# Patient Record
Sex: FEMALE | Race: WHITE | Employment: FULL TIME | URBAN - METROPOLITAN AREA
[De-identification: names, ages, dates, MRNs, and addresses within clinical notes are randomized per-mention and may not be internally consistent; named-entity substitution may affect disease eponyms.]

---

## 2018-05-14 ENCOUNTER — HOSPITAL ENCOUNTER (OUTPATIENT)
Dept: LAB | Age: 28
Discharge: HOME | End: 2018-05-14
Payer: COMMERCIAL

## 2018-05-14 LAB
ALBUMIN SERPL-MCNC: 4 G/DL (ref 3.5–5)
ALBUMIN/GLOB SERPL: 1.2 {RATIO} (ref 1.2–3.5)
ALP SERPL-CCNC: 53 U/L (ref 50–136)
ALT SERPL-CCNC: 20 U/L (ref 12–65)
ANION GAP SERPL CALC-SCNC: 6 MMOL/L (ref 7–16)
AST SERPL-CCNC: 15 U/L (ref 15–37)
BASOPHILS # BLD: 0 K/UL (ref 0–0.2)
BASOPHILS NFR BLD: 0 % (ref 0–2)
BILIRUB SERPL-MCNC: 0.8 MG/DL (ref 0.2–1.1)
BUN SERPL-MCNC: 13 MG/DL (ref 6–23)
CALCIUM SERPL-MCNC: 9.2 MG/DL (ref 8.3–10.4)
CHLORIDE SERPL-SCNC: 103 MMOL/L (ref 98–107)
CO2 SERPL-SCNC: 31 MMOL/L (ref 21–32)
CREAT SERPL-MCNC: 0.75 MG/DL (ref 0.6–1)
DIFFERENTIAL METHOD BLD: ABNORMAL
EOSINOPHIL # BLD: 0 K/UL (ref 0–0.8)
EOSINOPHIL NFR BLD: 0 % (ref 0.5–7.8)
ERYTHROCYTE [DISTWIDTH] IN BLOOD BY AUTOMATED COUNT: 13.7 % (ref 11.9–14.6)
GLOBULIN SER CALC-MCNC: 3.3 G/DL (ref 2.3–3.5)
GLUCOSE SERPL-MCNC: 105 MG/DL (ref 65–100)
HCT VFR BLD AUTO: 43.3 % (ref 35.8–46.3)
HGB BLD-MCNC: 14.9 G/DL (ref 11.7–15.4)
IMM GRANULOCYTES # BLD: 0 K/UL (ref 0–0.5)
IMM GRANULOCYTES NFR BLD AUTO: 0 % (ref 0–5)
LYMPHOCYTES # BLD: 2.5 K/UL (ref 0.5–4.6)
LYMPHOCYTES NFR BLD: 35 % (ref 13–44)
MCH RBC QN AUTO: 28.5 PG (ref 26.1–32.9)
MCHC RBC AUTO-ENTMCNC: 34.4 G/DL (ref 31.4–35)
MCV RBC AUTO: 82.8 FL (ref 79.6–97.8)
MONOCYTES # BLD: 0.7 K/UL (ref 0.1–1.3)
MONOCYTES NFR BLD: 9 % (ref 4–12)
NEUTS SEG # BLD: 3.9 K/UL (ref 1.7–8.2)
NEUTS SEG NFR BLD: 56 % (ref 43–78)
PLATELET # BLD AUTO: 177 K/UL (ref 150–450)
PMV BLD AUTO: 10.8 FL (ref 10.8–14.1)
POTASSIUM SERPL-SCNC: 4.1 MMOL/L (ref 3.5–5.1)
PROT SERPL-MCNC: 7.3 G/DL (ref 6.3–8.2)
RBC # BLD AUTO: 5.23 M/UL (ref 4.05–5.25)
SODIUM SERPL-SCNC: 140 MMOL/L (ref 136–145)
WBC # BLD AUTO: 7.1 K/UL (ref 4.3–11.1)

## 2018-05-14 PROCEDURE — 80053 COMPREHEN METABOLIC PANEL: CPT | Performed by: NURSE PRACTITIONER

## 2018-05-14 PROCEDURE — 85025 COMPLETE CBC W/AUTO DIFF WBC: CPT | Performed by: NURSE PRACTITIONER

## 2018-05-14 PROCEDURE — 36415 COLL VENOUS BLD VENIPUNCTURE: CPT | Performed by: NURSE PRACTITIONER

## 2020-07-22 ENCOUNTER — HOSPITAL ENCOUNTER (OUTPATIENT)
Dept: LAB | Age: 30
Discharge: HOME | End: 2020-07-22
Payer: COMMERCIAL

## 2020-07-22 DIAGNOSIS — R87.619 ABNORMAL CERVICAL PAPANICOLAOU SMEAR, UNSPECIFIED ABNORMAL PAP FINDING: ICD-10-CM

## 2020-07-22 LAB
REFERENCE LAB,REFLB: NORMAL
TEST DESCRIPTION:,ATST: NORMAL

## 2020-07-22 PROCEDURE — 88142 CYTOPATH C/V THIN LAYER: CPT

## 2020-07-22 PROCEDURE — 88305 TISSUE EXAM BY PATHOLOGIST: CPT

## 2020-08-03 ENCOUNTER — TELEPHONE (OUTPATIENT)
Dept: CASE MANAGEMENT | Age: 30
End: 2020-08-03

## 2020-08-03 NOTE — TELEPHONE ENCOUNTER
Patient called triage for pap results. I returned her call and she is aware that no malignant cells were detected.

## 2021-01-25 PROBLEM — F33.41 RECURRENT MAJOR DEPRESSIVE DISORDER, IN PARTIAL REMISSION (HCC): Status: ACTIVE | Noted: 2021-01-25

## 2021-02-04 PROBLEM — Z98.890 HISTORY OF LOOP ELECTROSURGICAL EXCISION PROCEDURE (LEEP): Status: ACTIVE | Noted: 2021-02-04

## 2021-02-04 LAB
HBSAG, EXTERNAL: NORMAL
HEPATITIS C AB,   EXT: NORMAL
HIV, EXTERNAL: NORMAL
RPR, EXTERNAL: NORMAL
RUBELLA, EXTERNAL: NORMAL

## 2021-02-05 PROBLEM — Z34.90 ENCOUNTER FOR SUPERVISION OF NORMAL PREGNANCY, ANTEPARTUM: Status: ACTIVE | Noted: 2021-02-05

## 2021-03-07 PROBLEM — O09.90 SUPERVISION OF HIGH RISK PREGNANCY, ANTEPARTUM: Status: ACTIVE | Noted: 2021-02-05

## 2021-04-01 PROBLEM — O44.02 PLACENTA PREVIA IN SECOND TRIMESTER: Status: ACTIVE | Noted: 2021-04-01

## 2021-05-28 NOTE — TELEPHONE ENCOUNTER
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 Order for [address] Baby Box pending for your convenience. Thank you,  Martha Davis, [facility]  [address]  Department, toll free: [phone], option [address].      =======================================================================    [place] REVIEW - Be Well With Brenna Prather is a 31 y.o. female currently identified as interested in receiving a [address] \"Baby Box\" containing a 1 year supply of prenatal vitamins and Ferrous gluconate 324mg form [facility]. Contents of Baby Box:   Welcome Letter   3 month supply of Certifi Prenatal Gummies (Take 2 gummies once daily) with 3 refills   3 month supply of Ferrous gluconate 324mg tablets (Take 1 tablet once daily) with 3 refills    Thank you  Lindsey Davis, Velia  [address]  Department, toll free: [phone], [address]

## 2021-06-24 PROBLEM — O24.410 DIET CONTROLLED WHITE CLASSIFICATION A1 GESTATIONAL DIABETES MELLITUS (GDM): Status: ACTIVE | Noted: 2021-06-24

## 2021-06-28 ENCOUNTER — PATIENT OUTREACH (OUTPATIENT)
Dept: OTHER | Age: 31
End: 2021-06-28

## 2021-06-28 NOTE — PROGRESS NOTES
ACM attempted to reach patient for Transition of Care/Maternity CM call. HIPAA compliant message left requesting a return phone call at patients convenience. Will continue to follow. BWWB  DUNG 9/18/21  28 WEEKS  NEXT APPT 7/8/21  [facility]OMENS  HSV OUTBREAK AT 22 WEEKS  ANXIETY/ZOLOFT  GEST DIABETES- Decrease carbohydrate intake--goal 50g/day      Check FSBG 4x/day      Please give these parameters:  Fasting:  <95  1hr postprandial:  <120      Bring list of recorded FSBG to our office at your next visit to discuss if medication needed.    YES TO BOTH/VIT/BREAST PUMP

## 2021-06-29 ENCOUNTER — PATIENT OUTREACH (OUTPATIENT)
Dept: OTHER | Age: 31
End: 2021-06-29

## 2021-06-29 NOTE — PROGRESS NOTES
ATTEMPTED TO REACH THIS PT TODAY BUT HER VM IS NOT SET UP COULD NOT LEAVE A VM AND THERE WAS NO ANSWER. Will continue to monitor. Will send utr letter via toucanBox.

## 2021-06-30 ENCOUNTER — PATIENT OUTREACH (OUTPATIENT)
Dept: OTHER | Age: 31
End: 2021-06-30

## 2021-06-30 NOTE — PROGRESS NOTES
Pt message from unable to reach letter. Heljesus  Kalpana I missed your call twice. Im sure its after hours now. You can call me again tomorrow, i will be listening out for your call. Care Transitions subsequent Follow Up Call    Giovanna Miguel contacted the patient by telephone for follow-up call. Verified name and  with patient as identifiers. Risk Factors Identified: Gestational diabetes and Depression/Mental Health    Needs to be reviewed by the provider   none         Method of communication with provider : none      Advance Care Planning:   Does patient have an Advance Directive: not on file     Does patient have OB/Gyn Selected? Yes    Discussed follow up appointments. If no appointment was previously scheduled, appointment scheduling offered: Yes  [facility] follow up appointment(s):   Future Appointments   Date Time Provider Etienne Conroy   2021 11:45 AM [facility]   2021 12:30 PM Tri Tariq MD [facility]     Non-[facility] follow up appointment(s): N/A    OB History:   OB History    Para Term  AB Living   3 1 1   1 1   SAB TAB Ectopic Molar Multiple Live Births             1      # Outcome Date GA Lbr Cj/2nd Weight Sex Delivery Anes PTL Lv   3 Current            2 Term 17 37w3d  6 lb 6 oz (2.892 kg) M Vag-Spont EPI  AUDRA   1 AB      TAB         Obstetric Comments   G1 EAB    G2  17 at 37w3d w/ [address] [employer] (\"Ha\")--complicated by [facility]        28w4d    Medication reconciliation was performed with patient, who verbalizes understanding of administration of home medications. Advised obtaining a 90-day supply of all daily and as-needed medications. Barriers/Support system:  spouse   Return to work planning? Yes    2nd and 3rd Trimester Focused Assessment:   Healthy behavior review, Labs reviewed-discussed bg testing done recently and the results/also looked up what the parameters were listed a few site while on call with pt.  Pt has some questions and concerns with the results and has some gestational diabetic questions that I suggested I refer her over to the pharmacy team that works with these patients. , Fetal movement-baby moving well, Red flags: bleeding/leaking, Labor signs and symptoms-pt not having any she know who to call and when, this is her 2nd baby. She works as a RN in surgery and Nutrition-started some [address] about type of diet she could benefit from to help with the gestational diabetes especially the recommendations from her provider's office. BG ranges-discussed with pt her recommended ranges from ob office thus far  Fasting:  <95   YES TO BOTH/VIT/BREAST PUMP received both  works mon-fri 5-1pm and one long day per week  still working  expecting girl  next ob appt 7/8- dr. Jaswinder Grover  ob visit [facility]  baby moving well/no lof/vag bleeding  2nd baby      Patient given an opportunity to ask questions and does not have any further questions or concerns at this time. Reviewed appropriate site of care based on symptoms and resources available to patient including: Benefits related nurse triage line, When to call 911 and Condition related references. The patient agrees to contact the OB/Gyn office for questions related to their healthcare. Goals      Attends follow-up appointments as directed       Educate and encourage importance of FU for prevention of complications or disease;   Assess the patient's relationship with a PCP and next FU visit scheduled;   Discuss importance of adherence to treatment plan and follow up visits;   Identify any barriers in transportation or access to FU appointments.  Assist patient with making FU appointments as needed;    It's also a good idea to know your test results.  Keep a list of the medicines you take.        Patient will identify signs and symptoms requiring evaluation and intervention      Demonstrate how client is to evaluate contraction activity after discharge (e.g., lying down, tilted to the side with a pillow to the back, placing fingertips on the fundus for approximately 1 hour to note hardening or tightening of the uterus).  Identify signs and/or symptoms that should be reported immediately to the healthcare provider (e.g, sustained uterine contractions, clear drainage from vagina, bleeding).  Provide information about follow-up care when client is discharged.  Advise client to empty bladder every two (2) hours while awake.  Review daily fluid need; avoid coffee.  Encourage regular rest periods 2-3 times a day in side-lying position. If bedrest is to be continued after discharge, suggest client spend part of day on couch or recliner. Red flags maternity  Call physician or midwife, return to Labor and Delivery, call [phone], or go to the nearest Emergency Room if:  increased leakage or fluid, contractions more than 10 per 1 hour, decreased fetal movement, persistent low back pain  or cramping, bleeding from vaginal area, difficulty urinating, pain with urination, difficulty breathing, new calf pain,  persistent headache or vision change              Plan for follow-up call in 10-14 days based on severity of symptoms and risk factors.   Plan for next call: follow up appointment-7/8 appt with dr. Eugenio Tran pt to have us as well

## 2021-06-30 NOTE — PATIENT INSTRUCTIONS
Gestational Diabetes: Care Instructions  Your Care Instructions     Gestational diabetes can develop during pregnancy. When you have this condition, the insulin in your body is not able to keep your blood sugar in a normal range. If you do not control your blood sugar, your baby can grow too big and have problems right after birth such as low blood sugar. Most of the time, gestational diabetes goes away after a baby is born. But if you have had gestational diabetes, you have a greater chance of having it in a future pregnancy and of developing type 2 diabetes. To check for diabetes, you may have a follow-up glucose tolerance test 4 to 12 weeks after your baby is born or after you stop breastfeeding your baby. If the results of this test are normal, experts recommend that you get tested for type 2 diabetes at least every 3 years. You may be able to control your blood sugar with a healthy diet and regular exercise. Staying at a healthy weight also may keep you from getting type 2 diabetes later on. If diet and exercise do not lower your blood sugar enough, you may need to take diabetes medicine or insulin. Follow-up care is a key part of your treatment and safety. Be sure to make and go to all appointments, and call your doctor if you are having problems. It's also a good idea to know your test results and keep a list of the medicines you take. How can you care for yourself at home? · If your doctor prescribes insulin, inject it daily as directed. Your doctor will tell you how and when to take your insulin. · Check your blood sugar as directed. Your doctor will tell you how and when to check your blood sugar. · Monitor your baby's movement as directed. Your doctor may ask you to report how many times in an hour you feel your baby move. · Eat a balanced diet. You may want to meet with a registered dietitian. They can teach you how to spread carbohydrates through the day.  This may keep your blood sugar from going up quickly after meals. If you are taking insulin, you also can learn to match the amount of insulin you take at meals to the amount of carbohydrates you eat. · Don't diet to lose weight. It's not healthy when you're pregnant. · Get daily exercise. This can help lower your blood sugar. Walking and swimming are good choices. But don't do any exercise unless you talk with your doctor first.  When should you call for help? Call 911 anytime you think you may need emergency care. For example, call if:    · You passed out (lost consciousness), or you suddenly become very sleepy or confused. (You may have very low blood sugar.)     · You have symptoms of high blood sugar, such as:  ? Blurred vision. ? Trouble staying awake or being woken up. ? Fast, deep breathing. ? Breath that smells fruity. ? Belly pain, not feeling hungry, and vomiting. ? Feeling confused. Call your doctor now or seek immediate medical care if:    · You are sick and cannot control your blood sugar.     · You have been vomiting or have had diarrhea for more than 6 hours.     · Your blood sugar stays higher than the level your doctor has set for you.     · You have symptoms of low blood sugar, such as:  ? Sweating. ? Feeling nervous, shaky, and weak. ? Extreme hunger and slight nausea. ? Dizziness and headache.  ? Blurred vision. ? Confusion. Watch closely for changes in your health, and be sure to contact your doctor if:    · You have a hard time knowing when your blood sugar is low.     · You have trouble keeping your blood sugar in the target range.     · You often have problems controlling your blood sugar. Where can you learn more? Go to http://www.gray.com/  Enter A800 in the search box to learn more about \"Gestational Diabetes: Care Instructions. \"  Current as of: August 31, 2020               Content Version: 12.8  © 2572-9448 Health[employer], [employer].    Care instructions adapted under license by [employer] (which disclaims liability or warranty for this information). If you have questions about a medical condition or this instruction, always ask your healthcare professional. [address] any warranty or liability for your use of this information. Gestational Diabetes Diet: Care Instructions  Your Care Instructions     Gestational diabetes is a form of diabetes that can happen during pregnancy. It usually goes away after the baby is born. Diabetes means that your pancreas can't make enough insulin or your body does not use insulin properly. Insulin helps sugar enter your cells, where it is used for energy. You may be able to control your blood sugar while you are pregnant by eating a healthy diet and getting regular exercise. A dietitian or certified diabetes educator (CDE) can help you make a food plan. This plan will help control your blood sugar and provide good nutrition for you and your baby. If diet and exercise don't lower or control your blood sugar, you may need diabetes medicine or insulin. Follow-up care is a key part of your treatment and safety. Be sure to make and go to all appointments, and call your doctor if you are having problems. It's also a good idea to know your test results and keep a list of the medicines you take. How can you care for yourself at home? · Learn which foods have carbohydrate. Eating too much carbohydrate will cause your blood sugar to go too high. Carbohydrate foods include:  ? Breads, cereals, pasta, and rice. ? Dried beans and starchy vegetables, like corn, peas, and potatoes. ? Fruits and fruit juice, milk, and yogurt. ? Candy, table sugar, soda pop, and drinks sweetened with sugar. · Learn how much carbohydrate you need each day. A dietitian or certified diabetes educator (CDE) can teach you how to keep track of how much carbohydrate you eat.   · Try to eat the same amount of carbohydrate at each meal. This will help keep your blood sugar steady. Do not save up your daily allowance of carbohydrate to eat at one meal.  · Limit foods that have added sugar. This includes candy, desserts, and soda pop. These foods need to be counted as part of your total carbohydrate intake for the day. · Do not drink alcohol. Alcohol is not safe for you or your baby. · Do not skip meals. Your blood sugar may drop too low if you skip meals and use insulin. · Write down what you eat every day. Review your record with your dietitian or CDE to see if you are eating the right amounts of foods. · Check your blood sugar first thing in the morning before you eat. Then check your blood sugar 1 to 2 hours after the first bite of each meal (or as your doctor recommends). This will help you see how the food you eat affects your blood sugar. Keep track of these levels. Share the record with your doctor. When should you call for help? Watch closely for changes in your health, and be sure to contact your doctor if:    · You have questions about your diet.     · You often have problems with high or low blood sugar. Where can you learn more? Go to http://www.gray.com/  Enter M291 in the search box to learn more about \"Gestational Diabetes Diet: Care Instructions. \"  Current as of: August 31, 2020               Content Version: 12.8  © 2006-2021 "[employer]. Care instructions adapted under license by [employer] (which disclaims liability or warranty for this information). If you have questions about a medical condition or this instruction, always ask your healthcare professional. [place] [address] any warranty or liability for your use of this information. Learning About When to Call Your Doctor During Pregnancy (After 20 Weeks)  Your Care Instructions  It's common to have concerns about what might be a problem during pregnancy.  Although most pregnant women don't have any serious problems, it's important to know when to call your doctor if you have certain symptoms or signs of labor. These are general suggestions. Your doctor may give you some more information about when to call. When to call your doctor (after 20 weeks)  Call 911 anytime you think you may need emergency care. For example, call if:  · You have severe vaginal bleeding. · You have sudden, severe pain in your belly. · You passed out (lost consciousness). · You have a seizure. · You see or feel the umbilical cord. · You think you are about to deliver your baby and can't make it safely to the hospital.  Call your doctor now or seek immediate medical care if:  · You have vaginal bleeding. · You have belly pain. · You have a fever. · You have symptoms of preeclampsia, such as:  ? Sudden swelling of your face, hands, or feet. ? New vision problems (such as dimness, blurring, or seeing spots). ? A severe headache. · You have a sudden release of fluid from your vagina. (You think your water broke.)  · You think that you may be in labor. This means that you've had at least 6 contractions in an hour. · You notice that your baby has stopped moving or is moving much less than normal.  · You have symptoms of a urinary tract infection. These may include:  ? Pain or burning when you urinate. ? A frequent need to urinate without being able to pass much urine. ? Pain in the flank, which is just below the rib cage and above the waist on either side of the back. ? Blood in your urine. Watch closely for changes in your health, and be sure to contact your doctor if:  · You have vaginal discharge that smells bad. · You have skin changes, such as:  ? A rash. ? Itching. ? Yellow color to your skin. · You have other concerns about your pregnancy. If you have labor signs at 37 weeks or more  If you have signs of labor at 37 weeks or more, your doctor may tell you to call when your labor becomes more active.  Symptoms of active labor include:  · Contractions that are regular. · Contractions that are less than 5 minutes apart. · Contractions that are hard to talk through. Follow-up care is a key part of your treatment and safety. Be sure to make and go to all appointments, and call your doctor if you are having problems. It's also a good idea to know your test results and keep a list of the medicines you take. Where can you learn more? Go to http://www.gray.com/  Enter N531 in the search box to learn more about \"Learning About When to Call Your Doctor During Pregnancy (After 20 Weeks). \"  Current as of: 2020               Content Version: 12.8   [employer]. Care instructions adapted under license by [employer] (which disclaims liability or warranty for this information). If you have questions about a medical condition or this instruction, always ask your healthcare professional. [place] [address] any warranty or liability for your use of this information. Weeks 26 to 30 of Your Pregnancy: Care Instructions  Overview     You are now entering your last trimester of pregnancy. Your baby is growing quickly. [place] probably feel your baby moving around more often. Your doctor may ask you to count your baby's kicks. Your back may ache as your body gets used to your baby's size and length. If you haven't already had the Tdap shot during this pregnancy, talk to your doctor about getting it. It will help protect your  against pertussis infection. During this time, it's important to take care of yourself and pay attention to what your body needs. If you feel sexual, you can explore ways to be close with your partner that match your comfort and desire. Follow-up care is a key part of your treatment and safety. Be sure to make and go to all appointments, and call your doctor if you are having problems.  It's also a good idea to know your test results and keep a list of the medicines you take. How can you care for yourself at home? Take it easy at work  · Take frequent breaks. If possible, stop working when you are tired, and rest during your lunch hour. · Take bathroom breaks every 2 hours. · Change positions often. If you sit for long periods, stand up and walk around. · When you stand for a long time, keep one foot on a low stool with your knee bent. After standing a lot, sit with your feet up. · Avoid fumes, chemicals, and tobacco smoke. Be sexual in your own way  · Having sex during pregnancy is okay, unless your doctor tells you not to. · You may be very interested in sex, or you may have no interest at all. · Your growing belly can make it hard to find a good position during intercourse. [place] and explore. · You may get cramps in your uterus when your partner touches your breasts. · A back rub may relieve the backache or cramps that sometimes follow orgasm. Learn about  labor  · Watch for signs of  labor. You may be going into labor if:  ? You have menstrual-like cramps, with or without nausea. ? You have about 6 or more contractions in 1 hour, even after you have had a glass of water and are resting. ? You have a low, dull backache that does not go away when you change your position. ? You have pain or pressure in your pelvis that comes and goes in a pattern. ? You have intestinal cramping or flu-like symptoms, with or without diarrhea.  ? You notice an increase or change in your vaginal discharge. Discharge may be heavy, mucus-like, watery, or streaked with blood. ? Your water breaks. · If you think you have  labor:  ? Drink 2 or 3 glasses of water or juice. Not drinking enough fluids can cause contractions. ? Stop what you are doing, and empty your bladder. Then lie down on your left side for at least 1 hour. ? While lying on your side, find your breast bone.  Put your fingers in the soft spot just below it. Move your fingers down toward your belly button to find the top of your uterus. Check to see if it is tight. ? Contractions can be weak or strong. Record your contractions for an hour. Time a contraction from the start of one contraction to the start of the next one.  ? Single or several strong contractions without a pattern are called [place]-Angel contractions. They are practice contractions but not the start of labor. They often stop if you change what you are doing. ? Call your doctor if you have regular contractions. Where can you learn more? Go to http://www.gray.com/  Enter M121 in the search box to learn more about \"Weeks 26 to 30 of Your Pregnancy: Care Instructions. \"  Current as of: October 8, 2020               Content Version: 12.8  © 2682-6701 [employer]. Care instructions adapted under license by [employer] (which disclaims liability or warranty for this information). If you have questions about a medical condition or this instruction, always ask your healthcare professional. [address] any warranty or liability for your use of this information.

## 2021-07-01 ENCOUNTER — TELEPHONE (OUTPATIENT)
Dept: PHARMACY | Age: 31
End: 2021-07-01

## 2021-07-01 NOTE — TELEPHONE ENCOUNTER
Received email from [address] [address] stating that patient had several questions regarding glucometer and the BWWB program.  Attempted to reach out and could not leave a voicemail. Will send eMaginhart message and followup if needed.     Lidya Farrell, PharmD, [address] [place] [address]  Phone: [phone] option-7

## 2021-07-08 PROBLEM — O36.63X0 EXCESSIVE FETAL GROWTH AFFECTING MANAGEMENT OF PREGNANCY IN THIRD TRIMESTER: Status: ACTIVE | Noted: 2021-07-08

## 2021-07-12 ENCOUNTER — PATIENT OUTREACH (OUTPATIENT)
Dept: OTHER | Age: 31
End: 2021-07-12

## 2021-07-12 NOTE — PROGRESS NOTES
ACM attempted to reach patient for Maternity CM call.  Unable to leave vm, mailbox not set up.    30 WEEK F/U CALL  GDM  HIGH RISK

## 2021-07-13 NOTE — TELEPHONE ENCOUNTER
No response from patient. Will sign off.     Nithin Jewell, PharmD, [address]  Phone: [phone] option-7    For Pharmacy Admin Tracking Only     Time Spent (min): 10

## 2021-07-28 PROBLEM — O24.415 GESTATIONAL DIABETES MELLITUS (GDM) IN THIRD TRIMESTER CONTROLLED ON ORAL HYPOGLYCEMIC DRUG: Status: ACTIVE | Noted: 2021-06-24

## 2021-08-02 PROBLEM — O24.414 INSULIN CONTROLLED GESTATIONAL DIABETES MELLITUS (GDM) IN THIRD TRIMESTER: Status: ACTIVE | Noted: 2021-06-24

## 2021-08-02 PROBLEM — F33.41 RECURRENT MAJOR DEPRESSIVE DISORDER, IN PARTIAL REMISSION (HCC): Status: RESOLVED | Noted: 2021-01-25 | Resolved: 2021-08-02

## 2021-08-02 PROBLEM — O44.02 PLACENTA PREVIA IN SECOND TRIMESTER: Status: RESOLVED | Noted: 2021-04-01 | Resolved: 2021-08-02

## 2021-08-02 PROBLEM — O34.43 HISTORY OF LOOP ELECTROSURGICAL EXCISION PROCEDURE (LEEP) OF CERVIX AFFECTING PREGNANCY IN THIRD TRIMESTER: Status: ACTIVE | Noted: 2021-02-04

## 2021-08-02 PROBLEM — O09.293 HX OF PREECLAMPSIA, PRIOR PREGNANCY, CURRENTLY PREGNANT, THIRD TRIMESTER: Status: ACTIVE | Noted: 2021-08-02

## 2021-08-02 PROBLEM — O36.63X0 EXCESSIVE FETAL GROWTH AFFECTING MANAGEMENT OF PREGNANCY IN THIRD TRIMESTER: Status: RESOLVED | Noted: 2021-07-08 | Resolved: 2021-08-02

## 2021-08-02 PROBLEM — O99.213 OBESITY AFFECTING PREGNANCY IN THIRD TRIMESTER: Status: ACTIVE | Noted: 2021-08-02

## 2021-08-25 LAB — GRBS, EXTERNAL: NORMAL

## 2021-08-26 PROBLEM — M54.9 BACK PAIN AFFECTING PREGNANCY IN THIRD TRIMESTER: Status: ACTIVE | Noted: 2021-08-26

## 2021-08-26 PROBLEM — O09.293 HX OF PREECLAMPSIA, PRIOR PREGNANCY, CURRENTLY PREGNANT, THIRD TRIMESTER: Status: RESOLVED | Noted: 2021-08-02 | Resolved: 2021-08-26

## 2021-08-26 PROBLEM — O99.013 ANEMIA DURING PREGNANCY IN THIRD TRIMESTER: Status: ACTIVE | Noted: 2021-08-26

## 2021-08-26 PROBLEM — O99.891 BACK PAIN AFFECTING PREGNANCY IN THIRD TRIMESTER: Status: ACTIVE | Noted: 2021-08-26

## 2021-09-11 ENCOUNTER — HOSPITAL ENCOUNTER (INPATIENT)
Age: 31
LOS: 2 days | Discharge: HOME | End: 2021-09-13
Attending: OBSTETRICS & GYNECOLOGY | Admitting: OBSTETRICS & GYNECOLOGY
Payer: COMMERCIAL

## 2021-09-11 PROBLEM — Z34.90 ENCOUNTER FOR INDUCTION OF LABOR: Status: ACTIVE | Noted: 2021-09-11

## 2021-09-11 LAB
ERYTHROCYTE [DISTWIDTH] IN BLOOD BY AUTOMATED COUNT: 15.5 % (ref 11.9–14.6)
GLUCOSE BLD STRIP.AUTO-MCNC: 143 MG/DL (ref 65–100)
HCT VFR BLD AUTO: 36.9 % (ref 35.8–46.3)
HGB BLD-MCNC: 11.8 G/DL (ref 11.7–15.4)
MCH RBC QN AUTO: 25 PG (ref 26.1–32.9)
MCHC RBC AUTO-ENTMCNC: 32 G/DL (ref 31.4–35)
MCV RBC AUTO: 78.2 FL (ref 79.6–97.8)
NRBC # BLD: 0 K/UL (ref 0–0.2)
PLATELET # BLD AUTO: 138 K/UL (ref 150–450)
PMV BLD AUTO: 11.8 FL (ref 9.4–12.3)
RBC # BLD AUTO: 4.72 M/UL (ref 4.05–5.2)
SERVICE CMNT-IMP: ABNORMAL
WBC # BLD AUTO: 9.3 K/UL (ref 4.3–11.1)

## 2021-09-11 PROCEDURE — 74011250636 HC RX REV CODE- 250/636: Performed by: OBSTETRICS & GYNECOLOGY

## 2021-09-11 PROCEDURE — 82962 GLUCOSE BLOOD TEST: CPT

## 2021-09-11 PROCEDURE — 85027 COMPLETE CBC AUTOMATED: CPT

## 2021-09-11 PROCEDURE — 74011250637 HC RX REV CODE- 250/637: Performed by: OBSTETRICS & GYNECOLOGY

## 2021-09-11 PROCEDURE — 65270000029 HC RM PRIVATE

## 2021-09-11 PROCEDURE — 74011000258 HC RX REV CODE- 258: Performed by: OBSTETRICS & GYNECOLOGY

## 2021-09-11 PROCEDURE — 86900 BLOOD TYPING SEROLOGIC ABO: CPT

## 2021-09-11 RX ORDER — ZOLPIDEM TARTRATE 5 MG/1
5 TABLET ORAL
Status: DISCONTINUED | OUTPATIENT
Start: 2021-09-11 | End: 2021-09-12

## 2021-09-11 RX ORDER — BUTORPHANOL TARTRATE 2 MG/ML
1 INJECTION INTRAMUSCULAR; INTRAVENOUS
Status: DISCONTINUED | OUTPATIENT
Start: 2021-09-11 | End: 2021-09-12 | Stop reason: HOSPADM

## 2021-09-11 RX ORDER — CALCIUM CARBONATE 200(500)MG
400 TABLET,CHEWABLE ORAL AS NEEDED
Status: DISCONTINUED | OUTPATIENT
Start: 2021-09-11 | End: 2021-09-12

## 2021-09-11 RX ORDER — ONDANSETRON 2 MG/ML
4 INJECTION INTRAMUSCULAR; INTRAVENOUS
Status: DISCONTINUED | OUTPATIENT
Start: 2021-09-11 | End: 2021-09-12

## 2021-09-11 RX ORDER — SODIUM CHLORIDE 0.9 % (FLUSH) 0.9 %
5-40 SYRINGE (ML) INJECTION AS NEEDED
Status: DISCONTINUED | OUTPATIENT
Start: 2021-09-11 | End: 2021-09-12

## 2021-09-11 RX ORDER — SODIUM CHLORIDE 0.9 % (FLUSH) 0.9 %
5-40 SYRINGE (ML) INJECTION EVERY 8 HOURS
Status: DISCONTINUED | OUTPATIENT
Start: 2021-09-11 | End: 2021-09-12

## 2021-09-11 RX ORDER — MINERAL OIL
120 OIL (ML) ORAL
Status: COMPLETED | OUTPATIENT
Start: 2021-09-11 | End: 2021-09-12

## 2021-09-11 RX ORDER — ACETAMINOPHEN 325 MG/1
650 TABLET ORAL
Status: DISCONTINUED | OUTPATIENT
Start: 2021-09-11 | End: 2021-09-12

## 2021-09-11 RX ORDER — LIDOCAINE HYDROCHLORIDE 20 MG/ML
JELLY TOPICAL
Status: DISCONTINUED | OUTPATIENT
Start: 2021-09-11 | End: 2021-09-12 | Stop reason: HOSPADM

## 2021-09-11 RX ORDER — LIDOCAINE HYDROCHLORIDE 10 MG/ML
1 INJECTION INFILTRATION; PERINEURAL
Status: DISCONTINUED | OUTPATIENT
Start: 2021-09-11 | End: 2021-09-12 | Stop reason: HOSPADM

## 2021-09-11 RX ADMIN — SODIUM CHLORIDE 5 MILLION UNITS: 900 INJECTION INTRAVENOUS at 20:07

## 2021-09-11 RX ADMIN — MISOPROSTOL 50 MCG: 100 TABLET ORAL at 20:05

## 2021-09-12 ENCOUNTER — ANESTHESIA (OUTPATIENT)
Dept: LABOR AND DELIVERY | Age: 31
End: 2021-09-12
Payer: COMMERCIAL

## 2021-09-12 ENCOUNTER — ANESTHESIA EVENT (OUTPATIENT)
Dept: LABOR AND DELIVERY | Age: 31
End: 2021-09-12
Payer: COMMERCIAL

## 2021-09-12 LAB
ABO + RH BLD: NORMAL
BLOOD GROUP ANTIBODIES SERPL: NORMAL
SPECIMEN EXP DATE BLD: NORMAL

## 2021-09-12 PROCEDURE — 74011000250 HC RX REV CODE- 250: Performed by: ANESTHESIOLOGY

## 2021-09-12 PROCEDURE — 74011000258 HC RX REV CODE- 258: Performed by: OBSTETRICS & GYNECOLOGY

## 2021-09-12 PROCEDURE — 74011250636 HC RX REV CODE- 250/636: Performed by: OBSTETRICS & GYNECOLOGY

## 2021-09-12 PROCEDURE — 75410000002 HC LABOR FEE PER 1 HR

## 2021-09-12 PROCEDURE — 74011000250 HC RX REV CODE- 250: Performed by: NURSE ANESTHETIST, CERTIFIED REGISTERED

## 2021-09-12 PROCEDURE — 74011250636 HC RX REV CODE- 250/636: Performed by: ANESTHESIOLOGY

## 2021-09-12 PROCEDURE — 74011250636 HC RX REV CODE- 250/636

## 2021-09-12 PROCEDURE — 77030018846 HC SOL IRR STRL H20 ICUM -A

## 2021-09-12 PROCEDURE — 75410000003 HC RECOV DEL/VAG/CSECN EA 0.5 HR

## 2021-09-12 PROCEDURE — A4300 CATH IMPL VASC ACCESS PORTAL: HCPCS | Performed by: ANESTHESIOLOGY

## 2021-09-12 PROCEDURE — 77030003028 HC SUT VCRL J&J -A

## 2021-09-12 PROCEDURE — 76060000078 HC EPIDURAL ANESTHESIA

## 2021-09-12 PROCEDURE — 74011250637 HC RX REV CODE- 250/637: Performed by: OBSTETRICS & GYNECOLOGY

## 2021-09-12 PROCEDURE — 75410000000 HC DELIVERY VAGINAL/SINGLE

## 2021-09-12 PROCEDURE — 77030014125 HC TY EPDRL BBMI -B: Performed by: ANESTHESIOLOGY

## 2021-09-12 PROCEDURE — 77030011945 HC CATH URIN INT ST MENT -A

## 2021-09-12 PROCEDURE — 36415 COLL VENOUS BLD VENIPUNCTURE: CPT

## 2021-09-12 PROCEDURE — 65270000029 HC RM PRIVATE

## 2021-09-12 PROCEDURE — 0HQ9XZZ REPAIR PERINEUM SKIN, EXTERNAL APPROACH: ICD-10-PCS | Performed by: OBSTETRICS & GYNECOLOGY

## 2021-09-12 RX ORDER — DEXTROSE, SODIUM CHLORIDE, SODIUM LACTATE, POTASSIUM CHLORIDE, AND CALCIUM CHLORIDE 5; .6; .31; .03; .02 G/100ML; G/100ML; G/100ML; G/100ML; G/100ML
125 INJECTION, SOLUTION INTRAVENOUS CONTINUOUS
Status: DISCONTINUED | OUTPATIENT
Start: 2021-09-12 | End: 2021-09-12

## 2021-09-12 RX ORDER — OXYTOCIN/RINGER'S LACTATE 30/500 ML
0-20 PLASTIC BAG, INJECTION (ML) INTRAVENOUS
Status: DISCONTINUED | OUTPATIENT
Start: 2021-09-12 | End: 2021-09-12

## 2021-09-12 RX ORDER — OXYTOCIN/RINGER'S LACTATE 30/500 ML
10 PLASTIC BAG, INJECTION (ML) INTRAVENOUS AS NEEDED
Status: COMPLETED | OUTPATIENT
Start: 2021-09-12 | End: 2021-09-12

## 2021-09-12 RX ORDER — LIDOCAINE HYDROCHLORIDE AND EPINEPHRINE 15; 5 MG/ML; UG/ML
INJECTION, SOLUTION EPIDURAL
Status: COMPLETED | OUTPATIENT
Start: 2021-09-12 | End: 2021-09-12

## 2021-09-12 RX ORDER — DOCUSATE SODIUM 100 MG/1
100 CAPSULE, LIQUID FILLED ORAL 2 TIMES DAILY
Status: DISCONTINUED | OUTPATIENT
Start: 2021-09-12 | End: 2021-09-13 | Stop reason: HOSPADM

## 2021-09-12 RX ORDER — SODIUM CHLORIDE 0.9 % (FLUSH) 0.9 %
5-40 SYRINGE (ML) INJECTION EVERY 8 HOURS
Status: DISCONTINUED | OUTPATIENT
Start: 2021-09-12 | End: 2021-09-12 | Stop reason: HOSPADM

## 2021-09-12 RX ORDER — OXYTOCIN/RINGER'S LACTATE 30/500 ML
87.3 PLASTIC BAG, INJECTION (ML) INTRAVENOUS AS NEEDED
Status: DISCONTINUED | OUTPATIENT
Start: 2021-09-12 | End: 2021-09-13 | Stop reason: HOSPADM

## 2021-09-12 RX ORDER — SIMETHICONE 80 MG
80 TABLET,CHEWABLE ORAL
Status: DISCONTINUED | OUTPATIENT
Start: 2021-09-12 | End: 2021-09-13 | Stop reason: HOSPADM

## 2021-09-12 RX ORDER — FAMOTIDINE 20 MG/1
20 TABLET, FILM COATED ORAL ONCE
Status: DISCONTINUED | OUTPATIENT
Start: 2021-09-12 | End: 2021-09-12 | Stop reason: HOSPADM

## 2021-09-12 RX ORDER — DIPHENHYDRAMINE HCL 25 MG
25 CAPSULE ORAL
Status: DISCONTINUED | OUTPATIENT
Start: 2021-09-12 | End: 2021-09-13 | Stop reason: HOSPADM

## 2021-09-12 RX ORDER — PRENATAL VIT 96/IRON FUM/FOLIC 27MG-0.8MG
1 TABLET ORAL DAILY
Status: DISCONTINUED | OUTPATIENT
Start: 2021-09-12 | End: 2021-09-13 | Stop reason: HOSPADM

## 2021-09-12 RX ORDER — OXYTOCIN/RINGER'S LACTATE 30/500 ML
PLASTIC BAG, INJECTION (ML) INTRAVENOUS
Status: COMPLETED
Start: 2021-09-12 | End: 2021-09-12

## 2021-09-12 RX ORDER — SODIUM CHLORIDE 0.9 % (FLUSH) 0.9 %
5-40 SYRINGE (ML) INJECTION AS NEEDED
Status: DISCONTINUED | OUTPATIENT
Start: 2021-09-12 | End: 2021-09-12 | Stop reason: HOSPADM

## 2021-09-12 RX ORDER — IBUPROFEN 600 MG/1
600 TABLET ORAL
Status: DISCONTINUED | OUTPATIENT
Start: 2021-09-12 | End: 2021-09-13 | Stop reason: HOSPADM

## 2021-09-12 RX ORDER — SERTRALINE HYDROCHLORIDE 100 MG/1
100 TABLET, FILM COATED ORAL
Status: DISCONTINUED | OUTPATIENT
Start: 2021-09-12 | End: 2021-09-13 | Stop reason: HOSPADM

## 2021-09-12 RX ORDER — ONDANSETRON 4 MG/1
4 TABLET, ORALLY DISINTEGRATING ORAL
Status: DISCONTINUED | OUTPATIENT
Start: 2021-09-12 | End: 2021-09-13 | Stop reason: HOSPADM

## 2021-09-12 RX ORDER — ZOLPIDEM TARTRATE 5 MG/1
5 TABLET ORAL
Status: DISCONTINUED | OUTPATIENT
Start: 2021-09-12 | End: 2021-09-13 | Stop reason: HOSPADM

## 2021-09-12 RX ORDER — ROPIVACAINE HYDROCHLORIDE 2 MG/ML
INJECTION, SOLUTION EPIDURAL; INFILTRATION; PERINEURAL AS NEEDED
Status: DISCONTINUED | OUTPATIENT
Start: 2021-09-12 | End: 2021-09-12 | Stop reason: HOSPADM

## 2021-09-12 RX ORDER — OXYCODONE HYDROCHLORIDE 5 MG/1
5 TABLET ORAL
Status: DISCONTINUED | OUTPATIENT
Start: 2021-09-12 | End: 2021-09-13 | Stop reason: HOSPADM

## 2021-09-12 RX ORDER — LIDOCAINE HYDROCHLORIDE 10 MG/ML
INJECTION INFILTRATION; PERINEURAL AS NEEDED
Status: DISCONTINUED | OUTPATIENT
Start: 2021-09-12 | End: 2021-09-12 | Stop reason: HOSPADM

## 2021-09-12 RX ORDER — NALOXONE HYDROCHLORIDE 0.4 MG/ML
0.4 INJECTION, SOLUTION INTRAMUSCULAR; INTRAVENOUS; SUBCUTANEOUS AS NEEDED
Status: DISCONTINUED | OUTPATIENT
Start: 2021-09-12 | End: 2021-09-13 | Stop reason: HOSPADM

## 2021-09-12 RX ADMIN — SODIUM CHLORIDE 2.5 MILLION UNITS: 9 INJECTION, SOLUTION INTRAVENOUS at 00:15

## 2021-09-12 RX ADMIN — DOCUSATE SODIUM 100 MG: 100 CAPSULE, LIQUID FILLED ORAL at 17:50

## 2021-09-12 RX ADMIN — DOCUSATE SODIUM 100 MG: 100 CAPSULE, LIQUID FILLED ORAL at 10:41

## 2021-09-12 RX ADMIN — Medication 30000 MILLI-UNITS: at 03:55

## 2021-09-12 RX ADMIN — IBUPROFEN 600 MG: 600 TABLET, FILM COATED ORAL at 12:31

## 2021-09-12 RX ADMIN — ROPIVACAINE HYDROCHLORIDE 4 ML: 2 INJECTION, SOLUTION EPIDURAL; INFILTRATION at 03:16

## 2021-09-12 RX ADMIN — SERTRALINE 100 MG: 100 TABLET, FILM COATED ORAL at 21:08

## 2021-09-12 RX ADMIN — MISOPROSTOL 50 MCG: 100 TABLET ORAL at 00:15

## 2021-09-12 RX ADMIN — IBUPROFEN 600 MG: 600 TABLET, FILM COATED ORAL at 06:55

## 2021-09-12 RX ADMIN — MINERAL OIL 120 ML: 1000 LIQUID ORAL at 03:40

## 2021-09-12 RX ADMIN — SODIUM CHLORIDE, SODIUM LACTATE, POTASSIUM CHLORIDE, AND CALCIUM CHLORIDE 1000 ML: 600; 310; 30; 20 INJECTION, SOLUTION INTRAVENOUS at 03:00

## 2021-09-12 RX ADMIN — LIDOCAINE HYDROCHLORIDE,EPINEPHRINE BITARTRATE 3.5 ML: 15; .005 INJECTION, SOLUTION EPIDURAL; INFILTRATION; INTRACAUDAL; PERINEURAL at 03:03

## 2021-09-12 RX ADMIN — ROPIVACAINE HYDROCHLORIDE 9 ML/HR: 2 INJECTION, SOLUTION EPIDURAL; INFILTRATION at 03:18

## 2021-09-12 RX ADMIN — PRENATAL VIT W/ FE FUMARATE-FA TAB 27-0.8 MG 1 TABLET: 27-0.8 TAB at 10:41

## 2021-09-12 RX ADMIN — ROPIVACAINE HYDROCHLORIDE 5 ML: 2 INJECTION, SOLUTION EPIDURAL; INFILTRATION at 03:14

## 2021-09-12 RX ADMIN — LIDOCAINE HYDROCHLORIDE 5 ML: 10 INJECTION, SOLUTION INFILTRATION; PERINEURAL at 03:29

## 2021-09-12 RX ADMIN — IBUPROFEN 600 MG: 600 TABLET, FILM COATED ORAL at 17:50

## 2021-09-12 NOTE — H&P
History & Physical    Name: Ken Grey MRN: [id number]  SSN: [id number]    YOB: 1990  Age: 36 y.o. Sex: female      Subjective:     Estimated Date of Delivery: 21  OB History    Para Term  AB Living   3 1 1   1 1   SAB TAB Ectopic Molar Multiple Live Births             1      # Outcome Date GA Lbr Cj/2nd Weight Sex Delivery Anes PTL Lv   3 Current            2 Term 17 37w3d  2.892 kg M Vag-Spont EPI  AUDRA   1 AB      TAB         Obstetric Comments   G1 EAB    G2  17 at 44w3d w/ [facility] (\[facility]")--complicated by Lucas Gomez is admitted with pregnancy at 39w1d for induction of labor due to  gestational diabetes, on insulin. Prenatal course was normal.  Please see prenatal records for details. She was admitted at 39.0 wkg for cervical ripening and received two doses of cytotec. She denies any complaints at present. Notes feeling pressure with contractions and comfortable after epidural placement.      Past Medical History:   Diagnosis Date    Abnormal Papanicolaou smear of cervix     Anemia during pregnancy in third trimester 2021    Anxiety     BMI 31.0-31.9,adult     Chlamydia     Depression     Genital herpes     High grade squamous intraepithelial lesion (HGSIL), grade 3 LI, on biopsy of cervix     LEEP     History of gestational hypertension     HSV-1 infection     Recurrent major depressive disorder, in partial remission ([address] 2021    Thrombocytopenia ([address]      Past Surgical History:   Procedure Laterality Date    HX BREAST AUGMENTATION      HX COLPOSCOPY      HX LEEP PROCEDURE      HX WISDOM TEETH EXTRACTION       Social History     Occupational History    Not on file   Tobacco Use    Smoking status: Never Smoker    Smokeless tobacco: Never Used   Vaping Use    Vaping Use: Never used   Substance and Sexual Activity    Alcohol use: Never    Drug use: Never    Sexual activity: Yes     Partners: Male     Birth control/protection: None     Family History   Problem Relation Age of Onset    Hypertension Father     Cancer Father         Leukemia    Colon Cancer Father 48    Breast Cancer Maternal Aunt 27    Diabetes Paternal Grandfather     Ovarian Cancer Neg Hx        No Known Allergies  Prior to Admission medications    Medication Sig Start Date End Date Taking? Authorizing Provider   famotidine (Pepcid) 20 mg tablet Take 20 mg by mouth two (2) times a day. Yes Provider, Historical   insulin detemir U-100 (Levemir FlexTouch U-100 Insuln) 100 unit/mL (3 mL) inpn 12-25 units as directed twice daily; May substitute Lantus/Basaglar/Semglee 8/2/21  Yes Cori Malave MD   Insulin Needles, Disposable, (Unifine Pentips) 32 gauge x 5/32\" ndle Use up to 5x/day; Per insurance coverage 8/2/21  Yes Cori Malave MD   Blood-Glucose Meter monitoring kit One generic blood glucose meter. Test blood sugars four times daily: fasting and 1 hour after each meal. 7/8/21  Yes Lukasz Galaviz MD   lancets misc Generic lancets. Test blood sugars four times daily: fasting and 1 hour after each meal. 7/8/21  Yes Lukasz Galaviz MD   glucose blood VI test strips (ASCENSIA AUTODISC VI, ONE TOUCH ULTRA TEST VI) strip Generic test strips. Test blood sugars four times daily: fasting and 1 hour after each meal. 7/8/21  Yes Lukasz Galaviz MD   valACYclovir (VALTREX) 1 gram tablet Take 1 Tablet by mouth daily. 5/27/21  Yes Lukasz Galaviz MD   sertraline (ZOLOFT) 100 mg tablet Take 1 Tablet by mouth daily. 5/27/21  Yes Lukasz Galaviz MD   prenatal vit/iron fum/folic ac (PRENATAL 1+1 PO) Take  by mouth. Yes Provider, Historical        Review of Systems: A comprehensive review of systems was negative except for that written in the History of Present Illness.     Objective:     Vitals:  Vitals:    09/12/21 0320 09/12/21 0321 09/12/21 0322 09/12/21 0326   BP: (!) 151/77 137/73 130/71 (!) 144/98   Pulse: 98 86 91 96   Resp: Temp:       SpO2:       Weight:       Height:            Physical Exam:  Patient without distress. Lung: normal respiratory effort, equal expansion  Abdomen: soft, nontender  Fundus: soft and non tender  Perineum: blood absent, amniotic fluid present   Cervical Exam: 10 cm dilated    100% effaced    +1 station    Lower Extremities:  - Edema No  Membranes:  Spontaneous Rupture of Membranes; Amniotic Fluid: clear fluid  Fetal Heart Rate: Baseline: 155 per minute  Variability: moderate  Accelerations: yes  Decelerations: none          Prenatal Labs:   Lab Results   Component Value Date/Time    ABO/Rh(D) A POSITIVE 09/11/2021 07:50 PM    Rubella, External Immune 02/04/2021 12:00 AM    HBsAg, External Neg 02/04/2021 12:00 AM    HIV, External NR 02/04/2021 12:00 AM    RPR, External NR 02/04/2021 12:00 AM    GrBStrep, External POS 08/25/2021 12:00 AM       Impression/Plan:     Active Problems:    Encounter for induction of labor (9/11/2021)         Plan: Admit for induction of labor. Group B Strep positive, treated with penicillin. Now 10/100/+1. Will initiate pushing efforts.

## 2021-09-12 NOTE — PROGRESS NOTES
SBAR OUT Report: Mother    Verbal report given to Jaleesa Olivo RN (full name & credentials) on this patient, who is now being transferred to MIU (unit) for routine progression of care. The patient is not wearing a green \"Anesthesia-Duramorph\" band. Report consisted of patient's Situation, Background, Assessment and Recommendations (SBAR).  ID bands were compared with the identification form, and verified with the patient and receiving nurse. Information from the SBAR, Procedure Summary, Intake/Output, MAR and Recent Results and the [place] Report was reviewed with the receiving nurse; opportunity for questions and clarification provided.

## 2021-09-12 NOTE — L&D DELIVERY NOTE
Delivery Summary    Patient: Yvette Mcarthur MRN: [id number]  SSN: [id number]    YOB: 1990  Age: 36 y.o. Sex: female       Information for the patient's :  Vijaysofie Mezant [[id number]]       Labor Events:    Labor: No    Steroids: None   Cervical Ripening Date/Time: 2021 8:00 PM   Cervical Ripening Type: Misoprostol   Antibiotics During Labor: Yes   Rupture Identifier:      Rupture Date/Time: 2021 10:25 PM   Rupture Type: SROM   Amniotic Fluid Volume:      Amniotic Fluid Description: Clear    Amniotic Fluid Odor: None    Induction:         Induction Date/Time:        Indications for Induction:      Augmentation:     Augmentation Date/Time:      Indications for Augmentation:     Labor complications: None       Additional complications:        Delivery Events:  Indications For Episiotomy:     Episiotomy: None   Perineal Laceration(s):     Repaired:     Periurethral Laceration Location:      Repaired: Yes   Labial Laceration Location:     Repaired:     Sulcal Laceration Location:     Repaired:     Vaginal Laceration Location:     Repaired:     Cervical Laceration Location:     Repaired:     Repair Suture: Vicryl 3-0   Number of Repair Packets:     Estimated Blood Loss (ml):  ml   Quantitative Blood Loss (ml)                Delivery Date: 2021    Delivery Time: 3:47 AM  Delivery Type: Vaginal, Spontaneous  Sex:  Female    Gestational Age: 39w1d   Delivery Clinician:  Shira Saxena  Living Status: Living   Delivery Location: L&D 426          APGARS  One minute Five minutes Ten minutes   Skin color: 0   1        Heart rate: 2   2        Grimace: 2   2        Muscle tone: 2   2        Breathin   2        Totals: 8   9            Presentation: Vertex    Position: Middle Occiput Anterior  Resuscitation Method:  Suctioning-bulb; Tactile Stimulation     Meconium Stained: None      Cord Information: 3 Vessels  Complications: Knot  Cord around:    Delayed cord clamping? Yes  Cord clamped date/time:   Disposition of Cord Blood: Lab    Blood Gases Sent?: No    Placenta:  Date/Time: 2021  3:54 AM  Removal: Spontaneous      Appearance: Normal      Measurements:  Birth Weight: 3.185 kg      Birth Length: 50.2 cm      Head Circumference: 34 cm      Chest Circumference: 33 cm     Abdominal Girth: Other Providers:   Junior Shashank MIRANDA;EUNICE BUNDY;KEEGAN BUENO, Obstetrician;Primary Nurse;Primary  Nurse;Scrub Tech           Group B Strep:   Lab Results   Component Value Date/Time    GrBStrep, External POS 2021 12:00 AM     Information for the patient's :  Alexandra Segura [[id number]]   No results found for: ABORH, PCTABR, PCTDIG, BILI, ABORHEXT, ABORH     No results for input(s): PCO2CB, PO2CB, HCO3I, SO2I, IBD, PTEMPI, SPECTI, PHICB, ISITE, IDEV, IALLEN in the last 72 hours.        Delivery Note    Obstetrician:  Zachary Garcia MD    Assistant: none    Pre-Delivery Diagnosis: Term pregnancy, Induced labor and Pregnancy complicated by: gestational diabetes on insulin, HSV on valtrex suppression, gestational thrombocytopenia, and anxiety    Post-Delivery Diagnosis: Living  infant(s) and Female named Mackenzie    Intrapartum Event: None    Procedure: Spontaneous vaginal delivery    Epidural: YES    Monitor:  Fetal Heart Tones - External and Uterine Contractions - External    Indications for instrumental delivery: none    Estimated Blood Loss: 150 mL    Episiotomy: none    Laceration(s): Superficial perineal laceration, periurethral laceration    Laceration(s) repair: YES    Presentation: Cephalic    Fetal Description: jolly    Fetal Position: Occiput Anterior    Birth Weight: 3185 g    Birth Length: 50.2 cm    Apgar - One Minute: 8    Apgar - Five Minutes: 9    Umbilical Cord: True knot x 3  Specimens: placenta, discarded  Complications:  none           Cord Blood Results:   Information for the patient's :  Ermias Yuan Mary Mcpherson [[id number]]   No results found for: PCTABR, PCTDIG, BILI, ABORH     Prenatal Labs:     Lab Results   Component Value Date/Time    ABO/Rh(D) A POSITIVE 09/11/2021 07:50 PM    HBsAg, External Neg 02/04/2021 12:00 AM    HIV, External NR 02/04/2021 12:00 AM    Rubella, External Immune 02/04/2021 12:00 AM    RPR, External NR 02/04/2021 12:00 AM    GrBStrep, External POS 08/25/2021 12:00 AM        Procedure Description:  Patient reached complete dilation at +1 and was feeling urges and pressure to push. With maternal expulsive efforts over two contractions, delivered fetal head in direct occiput anterior position. Anterior shoulder delivered with gentle downward guidance followed by posterior shoulder and remainder of fetal body with usual maneuvers. Mouth and nose were bulb suctioned and infant placed on mother's chest for skin-to-skin contact. Umbilical cord was inspected and found to have 3 true knots present in it. Cord blood was then obtained for routine analysis. Placenta was then expressed and delivered. Bimanual exam with expression of small amount of retained membranes evacuated with no further evidence of retained products of conception with firm uterine tone and lower uterine segment clamped down. Inspection of vagina and perineum revealed a midline periurethral laceration and superficial perineal laceration. These were repaired with figure-of-eight sutures of 3-0 Vicryl without complication. Mother and infant stable immediately postpartum.       Attending Attestation: I was present and scrubbed for the entire procedure

## 2021-09-12 NOTE — PROGRESS NOTES
Pt laid back from epidural, feeling a lot of pressure. SVE c/c/+1. MD notified, coming from [facility], on her way.

## 2021-09-12 NOTE — ANESTHESIA POSTPROCEDURE EVALUATION
RY for labor and vaginal delivery    epidural    Anesthesia Post Evaluation      Multimodal analgesia: multimodal analgesia used between 6 hours prior to anesthesia start to PACU discharge  Patient location during evaluation: PACU  Patient participation: complete - patient participated  Level of consciousness: awake  Pain management: adequate  Airway patency: patent  Anesthetic complications: no  Cardiovascular status: acceptable and hemodynamically stable  Respiratory status: acceptable  Hydration status: acceptable  Post anesthesia nausea and vomiting:  none  Final Post Anesthesia Temperature Assessment:  Normothermia (36.0-37.5 degrees C)      INITIAL Post-op Vital signs: No vitals data found for the desired time range.

## 2021-09-12 NOTE — PROGRESS NOTES
Since SROM pt c/o increasing pain. Now rating pain 8/10. SVE shows slight change. Dr. Magda Reyes called and is agreeable to early epidural. If still unchanged by time next dose of Cytotec is due, MD would like to get third dose in if warranted. If cervical change noted, Can start pitocin in am. Bolusing for epidural now.

## 2021-09-12 NOTE — PROGRESS NOTES
Admission assessment complete see flowsheet. Pt oriented to room, call light and how to order meals. Discussed today plan of care with pt. Pt has pitocin infusing via pump with approximately 200ml remaining in bag. Pt denies h/a, vision changes, n/v. Pt aware to keep track of her I&O's. Bleeding precautions given. Questions encouraged and answered. Pt to call with needs/concerns. No s/s of distress noted. Pt in bed with call light in reach.

## 2021-09-12 NOTE — PROGRESS NOTES
Nutrition:  Best Practice Alert received for GDM. Diet: No diet orders on file    Patient is admitted for labor. Will defer assessment as per standard of care.     Electronically signed by José Luis Recinos MS, RD, LD on 9/12/2021 at 8:46 AM.

## 2021-09-12 NOTE — PROGRESS NOTES
Pt admitted to room 426. RN to bedside for admission assessment. Pt has arrived for scheduled cervical ripening with induction of labor. Consents signed. Admission database complete. POC discussed with pt. Questions and concerns discussed. EFM on and tracing. IV started, labs collected and sent. Bed low and locked, call light within reach. , Harshal Leslie at bedside. No needs/concerns at this time.

## 2021-09-12 NOTE — PROGRESS NOTES
SBAR IN Report: Mother    Verbal report received from Kylah Sharma RN (full name & credentials) on this patient, who is now being transferred from L&D (unit) for routine progression of care. The patient is not wearing a green \"Anesthesia-Duramorph\" band. Report consisted of patient's Situation, Background, Assessment and Recommendations (SBAR). [place] ID bands were compared with the identification form, and verified with the patient and transferring nurse. Information from the SBAR, Procedure Summary, Intake/Output, MAR and Recent Results and the [place] Report was reviewed with the transferring nurse; opportunity for questions and clarification provided.

## 2021-09-12 NOTE — PROGRESS NOTES
Imelda Arellano at bedside at 0300. GINA Wahl at bedside at 77 [employer] pt to sitting up on bedside at 0257. Timeout completed at 46 with MD, GINA and myself at bedside. Test dose given at 0311. Negative reaction. Dose given at 0317. Pt assisted to lying back in right tilt position. See anesthesia record for details. See vital sign flow sheet for BP. Tolerated procedure well.

## 2021-09-12 NOTE — PROGRESS NOTES
Called Dr. Ollie Maria to obtain nighttime prn medications (see orders). Updated MD on SVE, ctx pattern and induction status. Continue wit POC as planned.

## 2021-09-12 NOTE — PROGRESS NOTES
Pt called out requesting RN. Pt states \"something wet is dripping down my leg\". SROM confirmed with Nitrazine paper, which immediately turned bright blue. Discussed ROM with pt and how this may or may not change her POC. Pt states understanding. Offered new linens and a towel.

## 2021-09-12 NOTE — PROGRESS NOTES
Dr. Fior Archuleta at nurses station, per MD pt does not need to have her BS checked. Also per MD can reorder pt Zoloft 100mg every day. Above orders read back and verified.

## 2021-09-12 NOTE — PROGRESS NOTES
From: Huong Mckeon  To: Yuliana Carvajal DO  Sent: 3/1/2020 11:21 AM CST  Subject: Non-Urgent Medical Question    CARMELLA Hargrove. Huong Mckeon here. I saw Dr. Allison on February 10 and there were no signs of the cancer returning. He did say if there were any symptoms I should call him. It was less than a week when I began to feel more short of breath, rather in general than at times. My right outer ankle, about that same time, started to feel tight and slightly tender, with a pulling sensation going up and down stairs, etc. It is not red or warmer than usual as it was in August of 2014. Though the area is slightly swollen. That is the same ankle that it was finally felt to be an enlarged lymph node impairing the flow of a blood vessel causing the problem. I have been experiencing increased fatigue, and I did tell Dr. Allison that. Although the last 3 weeks I have been wiped out after supper is put away. I have worked full time hours during that time instead of working 4 days a week. 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 So, perhaps I just worked too much. I did have a couple of nights that I didn't sleep well because of my legs and feet. I have been waiting to see if these things improved. Not that I make an appointment and suddenly I am better! But the ankle and difficulty breathing have continued about the same. Should I call Dr. Allison? Did you want to see me before I call him? Should I wait a little longer to see if my symptoms improve? I feel a bit ridiculous because I just saw him 3 weeks ago. I don't want to be making a mountain out of a mole hill. Thanks. Huong   In chart to review prior to admission to MIU

## 2021-09-12 NOTE — PROGRESS NOTES
Safety Teaching reviewed:   1. Hand hygiene prior to handling the infant. 2. Use of bulb syringe  3. Bracelets with matching numbers are placed on mother and infant  3. An infant security tag  [facility]) is placed on the infant's ankle and monitored  5. All OB nurses wear pink Employee badges - do not give your baby to anyone without proper identification. 6. Never leave the baby alone in the room. 7. The infant should be placed on their back to sleep. on a firm mattress. No toys should be placed in the crib. (safe sleep video offered to view)  8. Never shake the baby (video offered to view)  9. Infant fall prevention - do not sleep with the baby, and place the baby in the crib while ambulating. 8. Mother and Baby Care booklet given to Mother.

## 2021-09-12 NOTE — ANESTHESIA PROCEDURE NOTES
Epidural Block    Patient location during procedure: OB  Start time: 9/12/2021 3:03 AM  End time: 9/12/2021 3:13 AM  Reason for block: labor epidural  Staffing  Performed: attending   Anesthesiologist: Rebeka Medellin MD  Preanesthetic Checklist  Completed: patient identified, IV checked, risks and benefits discussed, surgical consent, monitors and equipment checked, pre-op evaluation and timeout performed  Block Placement  Patient position: sitting  Prep: ChloraPrep  Sterility prep: cap, drape, mask, hand and gloves  Sedation level: no sedation  Patient monitoring: continuous pulse oximetry and heart rate  Approach: midline  Location: lumbar  Lumbar location: L4-L5  Epidural  Loss of resistance technique: saline  Guidance: landmark technique  Needle  Needle type: Tuohy   Needle gauge: 17 G  Needle length: 9 cm  Needle insertion depth: 6 cm  Catheter type: end hole  Catheter size: 19 G  Catheter at skin depth: 12 cm  Catheter securement method: surgical tape, clear occlusive dressing and liquid medical adhesive  Test dose: negative  Medications Administered  Lidocaine-EPINEPHrine (XYLOCAINE) 1.5 %-1:200,000 Epidural, 3.5 mL  Assessment  Block outcome: pain improved  Number of attempts: 1  Procedure assessment: patient tolerated procedure well with no immediate complications  Additional Notes  Epidural timeout performed prior to procedure. Negative aspiration for blood and CSF. No intravascular on intrathecal symptoms with test dose.

## 2021-09-12 NOTE — PROGRESS NOTES
Progress Note                               Patient: Dickson Mullins MRN: [id number]  SSN: [id number]    YOB: 1990  Age: 36 y.o. Sex: female      Postpartum Day Number 0    Subjective:     Sharmin Miller is seen and examined at bedside. She has been able to get a little bit of rest post-delivery. Notes that bleeding is moderate, cramping not too bad. She has tried breastfeeding and reports infant with good latch. Objective:     Patient Vitals for the past 18 hrs:   Temp Pulse Resp BP SpO2   21 0722  72  (!) 153/81    21 0710  74  (!) 151/69    21 0610  73  (!) 134/94    21 0555  68  131/65    21 0540  68  130/66    21 0525  68  133/73    21 0510  68  121/87    21 0440  67  129/60    21 0410 98.3 °F (36.8 °C) 85  131/71    21 0336  (!) 102  (!) 134/92    21 0329  (!) 118  (!) 131/91    21 0328  87  139/79    21 0326  96  (!) 144/98    21 0324  (!) 109  (!) 164/82    21 0322  91  130/71    21 0321  86  137/73    21 0320  98  (!) 151/77    21 0318  72  (!) 144/76    21 0317  (!) 112  (!) 180/129    21 0316  73  (!) 161/143    21 0311  77  (!) 161/90    21 0235 98.5 °F (36.9 °C)       21 0029 98.1 °F (36.7 °C) 69  (!) 145/82    21 2231 98.5 °F (36.9 °C)       21 1948 98.7 °F (37.1 °C) 90 16 (!) 142/87 99 %        Temp (24hrs), Av.4 °F (36.9 °C), Min:98.1 °F (36.7 °C), Max:98.7 °F (37.1 °C)      Physical Exam:    Patient without distress. Heart: Regular rate and rhythm  Lung: clear to auscultation throughout lung fields and normal respiratory effort  Abdomen: soft, nontender  Fundus: firm and non tender, -1    Lab/Data Review: All lab results for the last 24 hours reviewed. Assessment and Plan:   1. Postpartum care: Patient appears to be having an uncomplicated postpartum course. Continue routine perineal care and maternal education. Plan discharge home tomorrow, PPD 1.     2. Elevated BP: Continue to monitor. If persists, will plan to obtain labs. Denies signs/symptoms of PreE.

## 2021-09-12 NOTE — ANESTHESIA PREPROCEDURE EVALUATION
Relevant Problems   No relevant active problems       Anesthetic History   No history of anesthetic complications            Review of Systems / Medical History  Patient summary reviewed and pertinent labs reviewed    Pulmonary  Within defined limits                 Neuro/Psych         Psychiatric history     Cardiovascular                  Exercise tolerance: >4 METS     GI/Hepatic/Renal  Within defined limits              Endo/Other    Diabetes: well controlled, type 2, using insulin         Other Findings              Physical Exam    Airway  Mallampati: II  TM Distance: 4 - 6 cm  Neck ROM: normal range of motion   Mouth opening: Normal     Cardiovascular               Dental  No notable dental hx       Pulmonary                 Abdominal         Other Findings            Anesthetic Plan    ASA: 2  Anesthesia type: epidural            Anesthetic plan and risks discussed with: Patient

## 2021-09-12 NOTE — PROGRESS NOTES
Delivery Note    Dr Ray Denver arrived to bedside at [phone]. Pt positioned for delivery and set up at [address] [facility]. Spontaneous vaginal delivery of viable female infant @ 26. Apgar's 8/9. See delivery summary for details.

## 2021-09-13 VITALS
HEART RATE: 66 BPM | WEIGHT: 211 LBS | TEMPERATURE: 98.1 F | SYSTOLIC BLOOD PRESSURE: 142 MMHG | OXYGEN SATURATION: 97 % | BODY MASS INDEX: 36.02 KG/M2 | DIASTOLIC BLOOD PRESSURE: 82 MMHG | HEIGHT: 64 IN | RESPIRATION RATE: 18 BRPM

## 2021-09-13 LAB
ALBUMIN SERPL-MCNC: 2.6 G/DL (ref 3.5–5)
ALBUMIN/GLOB SERPL: 0.8 {RATIO} (ref 1.2–3.5)
ALP SERPL-CCNC: 135 U/L (ref 50–130)
ALT SERPL-CCNC: 19 U/L (ref 12–65)
ANION GAP SERPL CALC-SCNC: 7 MMOL/L (ref 7–16)
AST SERPL-CCNC: 18 U/L (ref 15–37)
BILIRUB SERPL-MCNC: 0.5 MG/DL (ref 0.2–1.1)
BUN SERPL-MCNC: 7 MG/DL (ref 6–23)
CALCIUM SERPL-MCNC: 8.7 MG/DL (ref 8.3–10.4)
CHLORIDE SERPL-SCNC: 106 MMOL/L (ref 98–107)
CO2 SERPL-SCNC: 27 MMOL/L (ref 21–32)
CREAT SERPL-MCNC: 0.64 MG/DL (ref 0.6–1)
ERYTHROCYTE [DISTWIDTH] IN BLOOD BY AUTOMATED COUNT: 15.8 % (ref 11.9–14.6)
GLOBULIN SER CALC-MCNC: 3.4 G/DL (ref 2.3–3.5)
GLUCOSE SERPL-MCNC: 108 MG/DL (ref 65–100)
HCT VFR BLD AUTO: 36.2 % (ref 35.8–46.3)
HGB BLD-MCNC: 11.3 G/DL (ref 11.7–15.4)
MCH RBC QN AUTO: 25 PG (ref 26.1–32.9)
MCHC RBC AUTO-ENTMCNC: 31.2 G/DL (ref 31.4–35)
MCV RBC AUTO: 80.1 FL (ref 79.6–97.8)
NRBC # BLD: 0 K/UL (ref 0–0.2)
PLATELET # BLD AUTO: 105 K/UL (ref 150–450)
PMV BLD AUTO: 11.8 FL (ref 9.4–12.3)
POTASSIUM SERPL-SCNC: 3.9 MMOL/L (ref 3.5–5.1)
PROT SERPL-MCNC: 6 G/DL (ref 6.3–8.2)
RBC # BLD AUTO: 4.52 M/UL (ref 4.05–5.2)
SODIUM SERPL-SCNC: 140 MMOL/L (ref 136–145)
WBC # BLD AUTO: 6.5 K/UL (ref 4.3–11.1)

## 2021-09-13 PROCEDURE — 36415 COLL VENOUS BLD VENIPUNCTURE: CPT

## 2021-09-13 PROCEDURE — 80053 COMPREHEN METABOLIC PANEL: CPT

## 2021-09-13 PROCEDURE — 74011250637 HC RX REV CODE- 250/637: Performed by: OBSTETRICS & GYNECOLOGY

## 2021-09-13 PROCEDURE — 2709999900 HC NON-CHARGEABLE SUPPLY

## 2021-09-13 PROCEDURE — 85027 COMPLETE CBC AUTOMATED: CPT

## 2021-09-13 RX ORDER — IBUPROFEN 600 MG/1
600 TABLET ORAL
Qty: 40 TABLET | Refills: 0 | Status: SHIPPED | OUTPATIENT
Start: 2021-09-13 | End: 2021-09-23

## 2021-09-13 RX ADMIN — IBUPROFEN 600 MG: 600 TABLET, FILM COATED ORAL at 12:23

## 2021-09-13 RX ADMIN — IBUPROFEN 600 MG: 600 TABLET, FILM COATED ORAL at 03:47

## 2021-09-13 NOTE — PROGRESS NOTES
Progress Note                               Patient: Tim Ann MRN: [id number]  SSN: [id number]    YOB: 1990  Age: 36 y.o. Sex: female      Postpartum Day Number 1    Subjective:     Patient doing well postpartum without significant complaints. Voiding without difficulty. She reports her bleeding is moderate with passage of small clots. She reports that pain is controlled with ibuprofen. Breastfeeding is going well and baby is voiding and defecating without issue. She denies signs/symptoms of PreE. Objective:     Patient Vitals for the past 18 hrs:   Temp Pulse Resp BP SpO2   21 0344 98.4 °F (36.9 °C) 78 16 (!) 147/93 96 %   21 2338 98.2 °F (36.8 °C) 81 16 139/85 98 %   21 2031 97.9 °F (36.6 °C) 66 18 (!) 143/86 99 %   21 1457 98.3 °F (36.8 °C) 69 15 132/74 97 %        Temp (24hrs), Av.3 °F (36.8 °C), Min:97.9 °F (36.6 °C), Max:98.5 °F (36.9 °C)      Physical Exam:    Patient without distress. Heart: Regular rate and rhythm  Lung: clear to auscultation throughout lung fields and normal respiratory effort  Abdomen: soft, nontender  Fundus: firm and non tender, -1  Lower Extremities:  - Edema No    Lab/Data Review: All lab results for the last 24 hours reviewed. Assessment and Plan:     Patient appears to be having an uncomplicated postpartum course. Continue routine perineal care and maternal education. BP continues to occasionally elevate - check CBC/CMP this AM. Suspect that patient was moving towards developing GHTN prior to induction of labor. If labs this AM normal and BP remains mild range, will plan to d/c home. She will follow-up in office in one week for BP check in office if discharged.

## 2021-09-13 NOTE — DISCHARGE SUMMARY
Obstetrical Discharge Summary     Name: David Pearson MRN: [id number]  SSN: [id number]    YOB: 1990  Age: 36 y.o. Sex: female      Allergies: Patient has no known allergies. Admit Date: 2021    Discharge Date: 2021     Admitting Physician: Carter Shea MD     Attending Physician:  Enid Forrester MD     * Admission Diagnoses: Encounter for induction of labor [Z34.90]    * Discharge Diagnoses:   Information for the patient's :  Darryn Rich [[id number]]   Delivery of a 3.185 kg female infant via Vaginal, Spontaneous on 2021 at 3:47 AM  by [address]. Apgars were 8  and 9 . Additional Diagnoses:   Hospital Problems as of 2021 Date Reviewed: 2021          Codes Class Noted - Resolved POA    * (Principal) Encounter for induction of labor ICD-10-CM: Z34.90  ICD-9-CM: V22.1  2021 - Present Yes        Obesity affecting pregnancy in third trimester ICD-10-CM: O99.213  ICD-9-CM: 649.13  2021 - Present Yes        Insulin controlled gestational diabetes mellitus (GDM) in third trimester ICD-10-CM: O24.414  ICD-9-CM: 648.83  2021 - Present Yes    Overview Addendum 2021  5:16 PM by Hernan Lizama MD     A1DM:   Referral made  for DM teaching  Glucometer, lancets, testing strips sent and discussed fingersticks    S>D last visit -- growth US today:  GP 70%, AC 92%, SAM 21 (DVP 6cm)  Breech     Pelvis proven 6#6 at 37wks      RTO in 2 weeks for our OB and discuss FS BG log  Repeat growth ultrasound with BPP in 3 weeks    2021: Glucose log reviewed. Ranges from 96 to 213. FBGs 105-125 all elevated and most PPs elevated. 2021: Glucose log reviewed. Ranges from 99 to 171. FBGs  all elevated. Pt taking Metformin 1000 mg HS.    2021 at [facility]: Glucose log reviewed. Ranges from 94 to 217. FBGs  all elevated, most PPs elevated.  Discussed in length with pt regarding medication and since Metformin hadn't really made any change to her readings, will start insulin. Pt to d/c Metformin. Insulin sheet provided. Lantus 12/12.  8/9/2021: Glucose log reviewed. Ranges from 86 to 138. Improved FBG readings with some mixed elevations. Pt wo/ strips d/t lag in getting them via mail. Adjusted dose to 14 units BID. Will increase dose to Lantus 16/16.   8/17/2021: Glucose log reviewed. Ranges from 65 to 150. FBGs WNL, most PPs WNL, just a few mild elevations, pt having dips between meals, reduce dosing. Lantus 14/16.   8/26/2021 at [facility]: Log reviewed, a few elevations, overall good control. Lantus 16/16. Adjust insulin PRN  Continue twice weekly testing in OB office.               History of gestational hypertension ICD-10-CM: Z87.59  ICD-9-CM: V13.29  Unknown - Present Yes    Overview Addendum 4/1/2021  8:24 AM by Richelle Lopez MD     Hx GHTN (at 37w3d)  No hx CHTN  ASA 162mg 12-36 wks  baseline HELLP labs wnl (plts low normal--159K)  Baseline P:C ratio 0.07                Anxiety during pregnancy ICD-10-CM: O99.340, F41.9  ICD-9-CM: 648.43, 300.00  Unknown - Present Yes    Overview Addendum 8/2/2021  3:24 PM by Maria Teresa Wasserman     8/2/2021 at [facility]: Zoloft 100mg every day, No SI/HI             Genital HSV ICD-10-CM: A60.00  ICD-9-CM: 054.10  Unknown - Present Yes    Overview Addendum 5/28/2021  4:09 PM by Richelle Lopez MD     Does admit to having a singular lesion appear around 22wks   Prior to that, had been years since last outbreak  Rx Valtrex 1000 every day --instructed to continue for rest of preg                       Lab Results   Component Value Date/Time    ABO/Rh(D) A POSITIVE 09/11/2021 07:50 PM    Rubella, External Immune 02/04/2021 12:00 AM    GrBStrep, External POS 08/25/2021 12:00 AM      Immunization History   Administered Date(s) Administered    DTP 1990, 1990, 02/16/1993, 11/16/1993, 07/25/1996    Hep B Vaccine 04/27/2012    Hib (HbOC) 1990, 1990, 02/16/1993, 11/16/1993    IPV 1990, 1992, 1993, 1996    Influenza Vaccine 10/31/2018, 10/13/2020    MMR 1996, 1996    Meningococcal (MCV4P) Vaccine 2012    TB Skin Test (PPD) Intradermal 2012, 2012, 2014    Tdap 2007, 2017    Varicella Virus Vaccine 2000       * Procedures:      * Discharge Condition: good    * Hospital Course: Normal hospital course following the delivery. She was noted to have elevated blood pressures off and on, but never in severe range. CBC/CMP obtained without any concerning lab values. She was noted to have thrombocytopenia, however, had a history of gestational thrombocytopenia and platelet count had not significantly dropped. She was instructed to follow-up in office in 1 week for postpartum BP check. She was discharged to home with the below medications and precautions. * Disposition: Home    Discharge Medications:   Current Discharge Medication List        START taking these medications    Details   ibuprofen (MOTRIN) 600 mg tablet Take 1 Tablet by mouth every six (6) hours as needed for Pain for up to 10 days. Qty: 40 Tablet, Refills: 0  Start date: 2021, End date: 2021           CONTINUE these medications which have NOT CHANGED    Details   famotidine (Pepcid) 20 mg tablet Take 20 mg by mouth two (2) times a day. sertraline (ZOLOFT) 100 mg tablet Take 1 Tablet by mouth daily. Qty: 30 Tablet, Refills: 2    Associated Diagnoses: Anxiety      prenatal vit/iron fum/folic ac (PRENATAL 1+1 PO) Take  by mouth.            STOP taking these medications       insulin detemir U-100 (Levemir FlexTouch U-100 Insuln) 100 unit/mL (3 mL) inpn Comments:   Reason for Stopping:         Insulin Needles, Disposable, (Unifine Pentips) 32 gauge x 5/32\" ndle Comments:   Reason for Stopping:         Blood-Glucose Meter monitoring kit Comments:   Reason for Stopping:         lancets misc Comments:   Reason for Stopping:         glucose blood VI test strips (ASCENSIA AUTODISC VI, ONE TOUCH ULTRA TEST VI) strip Comments:   Reason for Stopping:         valACYclovir (VALTREX) 1 gram tablet Comments:   Reason for Stopping:               * Follow-up Care/Patient Instructions: Activity: Activity as tolerated  Diet: Regular Diet  Wound Care: Keep wound clean and dry and As directed    Follow-up Information       Follow up With Specialties Details Why Contact Info    Dixie Nassar MD Obstetrics & Gynecology In 1 week  [address]  [phone]                          .

## 2021-09-13 NOTE — DISCHARGE INSTRUCTIONS
Patient Education      Discharge instruction to follow: Activity: Pelvis rest for 6 weeks, nothing in vagina     No heavy lifting over 15 lbs or strenuous exercise for 4 weeks     No push/pull motion such as sweeping or vacuuming for 4 weeks     No tub baths for 6 weeks    If using rainer-bottle continue to use until comfortable stopping. Change sanitary pad after each urination or bowel movement. Call MD for the following:      Fever over 100.4 F; pain not relieved by medication; foul smelling vaginal discharge or an increase in vaginal bleeding. Take medication as prescribed. Follow up with MD as order. After Your Delivery (the Postpartum Period): Care Instructions  Your Care Instructions     Congratulations on the birth of your baby. Like pregnancy, the  period can be a time of excitement, neo, and exhaustion. You may look at your wondrous little baby and feel happy. You may also be overwhelmed by your new sleep hours and new responsibilities. At first, babies often sleep during the days and are awake at night. They do not have a pattern or routine. They may make sudden gasps, jerk themselves awake, or look like they have crossed eyes. These are all normal, and they may even make you smile. In these first weeks after delivery, try to take good care of yourself. It may take 4 to 6 weeks to feel like yourself again, and possibly longer if you had a  birth. You will likely feel very tired for several weeks. Your days will be full of ups and downs, but lots of neo as well. Follow-up care is a key part of your treatment and safety. Be sure to make and go to all appointments, and call your doctor if you are having problems. It's also a good idea to know your test results and keep a list of the medicines you take. How can you care for yourself at home? Take care of your body after delivery  · Use pads instead of tampons for the bloody flow that may last as long as 2 weeks.   · Ease cramps with ibuprofen (Advil, Motrin). · Ease soreness of hemorrhoids and the area between your vagina and rectum with ice compresses or witch hazel pads. · Ease constipation by drinking lots of fluid and eating high-fiber foods. Ask your doctor about over-the-counter stool softeners. · Cleanse yourself with a gentle squeeze of warm water from a bottle instead of wiping with toilet paper. · Take a sitz bath in warm water several times a day. · Wear a good nursing bra. Ease sore and swollen breasts with warm, wet washcloths. · If you are not breastfeeding, use ice rather than heat for breast soreness. · Your period may not start for several months if you are breastfeeding. You may bleed more, and longer at first, than you did before you got pregnant. · Wait until you are healed (about 4 to 6 weeks) before you have sexual intercourse. Your doctor will tell you when it is okay to have sex. · Try not to travel with your baby for 5 or 6 weeks. If you take a long car trip, make frequent stops to walk around and stretch. Avoid exhaustion  · Rest every day. Try to nap when your baby naps. · Ask another adult to be with you for a few days after delivery. · Plan for  if you have other children. · Stay flexible so you can eat at odd hours and sleep when you need to. Both you and your baby are making new schedules. · Plan small trips to get out of the house. Change can make you feel less tired. · Ask for help with housework, cooking, and shopping. Remind yourself that your job is to care for your baby. Know about help for postpartum depression  · \"Baby blues\" are common for the first 1 to 2 weeks after birth. You may cry or feel sad or irritable for no reason. · Rest whenever you can. Being tired makes it harder to handle your emotions. · Go for walks with your baby. · Talk to your partner, friends, and family about your feelings.   · If your symptoms last for more than a few weeks, or if you feel very depressed, ask your doctor for help. · Postpartum depression can be treated. Support groups and counseling can help. Sometimes medicine can also help. Stay healthy  · Eat healthy foods so you have more energy and lose extra baby pounds. · If you breastfeed, avoid drugs. If you quit smoking during pregnancy, try to stay smoke-free. If you choose to have a drink now and then, have only one drink, and limit the number of occasions that you have a drink. Wait to breastfeed at least 2 hours after you have a drink to reduce the amount of alcohol the baby may get in the milk. · Start daily exercise after 4 to 6 weeks, but rest when you feel tired. · Learn exercises to tone your belly. Do Kegel exercises to regain strength in your pelvic muscles. You can do these exercises while you stand or sit. ? Squeeze the same muscles you would use to stop your urine. Your belly and thighs should not move. ? Hold the squeeze for 3 seconds, and then relax for 3 seconds. ? Start with 3 seconds. Then add 1 second each week until you are able to squeeze for 10 seconds. ? Repeat the exercise 10 to 15 times for each session. Do three or more sessions each day. · Find a class for new mothers and new babies that has an exercise time. · If you had a  birth, give yourself a bit more time before you exercise, and be careful. When should you call for help? Call  911 anytime you think you may need emergency care. For example, call if:    · You have thoughts of harming yourself, your baby, or another person.     · You passed out (lost consciousness).     · You have chest pain, are short of breath, or cough up blood.     · You have a seizure. Call your doctor now or seek immediate medical care if:    · You have severe vaginal bleeding.  This means you are passing blood clots and soaking through a pad each hour for 2 or more hours.     · You are dizzy or lightheaded, or you feel like you may faint.     · You have a fever.     · You have new or more belly pain.     · You have signs of a blood clot in your leg (called a deep vein thrombosis), such as:  ? Pain in the calf, back of the knee, thigh, or groin. ? Redness and swelling in your leg or groin.     · You have signs of preeclampsia, such as:  ? Sudden swelling of your face, hands, or feet. ? New vision problems (such as dimness, blurring, or seeing spots). ? A severe headache. Watch closely for changes in your health, and be sure to contact your doctor if:    · Your vaginal bleeding seems to be getting heavier.     · You have new or worse vaginal discharge.     · You feel sad, anxious, or hopeless for more than a few days.     · You do not get better as expected. Where can you learn more? Go to http://www.lynn.com/  Enter A461 in the search box to learn more about \"After Your Delivery (the Postpartum Period): Care Instructions. \"  Current as of: October 8, 2020               Content Version: 12.8  © 2006-2021 [employer]. Care instructions adapted under license by [employer] (which disclaims liability or warranty for this information). If you have questions about a medical condition or this instruction, always ask your healthcare professional. [address] any warranty or liability for your use of this information.

## 2021-09-13 NOTE — LACTATION NOTE

## 2021-09-13 NOTE — PROGRESS NOTES
Chart reviewed - hx of depression. SW met with patient while social distancing w/appropriate PPE. Patient denies any history of postpartum depression/anxiety. She states that she has experienced generalized anxiety (no depression); therefore, she is currently on Zoloft. She has been on Zoloft for approximately 4 years and feels that this medication manages her moods well. Patient plans to stay on the Zoloft postpartum. Patient given informational packet on  mood & anxiety disorders (resources/education). Family denies any additional needs from  at this time. Family has 's contact information should any needs/questions arise.     JENS Felix  [address] [facility]   [phone]

## 2021-09-13 NOTE — LACTATION NOTE
Early discharge. Mom and baby are going home today. Continue to offer the breast without restriction. Mom's milk should be fully in over the next few days. Reviewed engorgement precautions. Hand Expression has been demoed and written hand-out reviewed. As milk comes in baby will be more alert at the breast and swallows will be more obvious. Breasts may feel softer once baby has finished nursing. Baby should be back to birth weight by 3weeks of age. And then gain on average 1 oz per day for the next 2-3 months. Reviewed babies should be exclusively breastfeeding for the first 6 months and that breastfeeding should continue after introduction of appropriate complimentary foods after 6 months. Initial output should be at least 1 wet and 1 bowel movement for each day old baby is. By day 5-7 once milk is fully in baby will consistently have 6 or more soaking wet diapers and about 4 bowel movement. Some babies have a bowel movement with every feeding and some have 1-3 large bowel movements each day. Inadequate output may indicate inadequate feedings and should be reported to your Pediatrician. Bowel habits may change as baby gets older. Encouraged follow-up at Pediatrician in 1-2 days, no later than 1 week of age. Call [facility] for any questions as needed or to set up an OP visit. OP phone calls are returned within 24 hours. Community Breastfeeding Resource List given.

## 2021-09-13 NOTE — LACTATION NOTE
Early discharge. In to check on feeds. Mom doing breast and bottle feeding. Mom reports baby does latch but nipples sore. Discussed sore nipples. Ensure deep latch. Baby puts pressure on nipple now with low volume,  thick colostrum- should improve as milk flow increases. Mom primarily pumped/bottle fed with first child and may do same once home with this baby. Discussed supply and demand. Stimulate breasts every 3 hours via nursing or pumping, mom choice. Can supplement formula as desired. Mom feeding baby formula now. Gave baby 40ml. Reviewed intake volumes for age and weight. Feeding plan given and reviewed.

## 2021-09-13 NOTE — PROGRESS NOTES
Shift assessment complete, see flowsheets. Questions encouraged and answered. Pt encouraged to call out for any needs and pt verbalized understanding. Pt educated about I&O's, bleeding, or pain.

## 2021-09-13 NOTE — LACTATION NOTE
This note was copied from a baby's chart. Individualized Feeding Plan for Breastfeeding   Lactation Services [phone]      As much as possible, hold your baby on your chest so babys bare skin is against your bare skin with a blanket covering babys back, especially 30 minutes before it is time for baby to eat. Watch for early feeding cues such as, licking lips, sucking motions, rooting, hands to mouth. Crying is a late feeding cue. Feed your baby at least 8 times in 24 hours, or more if your baby is showing feeding cues. If baby is sleepy put baby skin to skin and watch for hunger cues. To rouse baby: unwrap, undress, massage hands, feet, & back, change diaper, gently change babys position from lying to sitting. 15-20 minutes on the first breast of active breastfeeding is considered a good feeding. Good, active breastfeeding is when baby is alert, tugging the nipple, their ear may move, and you can hear swallows. Allow baby to finish the first side before changing sides. Sleeping at the breast or only brief, light sucks should not be considered a good, full breastfeed. At each feeding:  __x__1. Do Suck Practice on finger before each feeding until sucking pattern is smooth. Try using index finger. Nail down towards tongue. __x__2. Hand Express for a few minutes prior to latching to help start milk flow. __x__3. Baby needs to NURSE WELL x 15-20 minutes on at least first breast, burp and offer 2nd breast at every feeding. If no sustained latch only attempt at breast for 10 minutes. If baby does NOT latch on and feed well on at least one side, or for soreness you should:   __x__4. Double pump for 15 minutes with breast massage and compression. Hand express for an additional 2-3 minutes per side. Pump after each feeding attempt or poor feeding, up to 8 times per day. If you are not putting baby to the breast you need to pump 8 times a day.  Pump every 3 hours.    __x__5. Give baby all of the breast milk you obtain from pumping. Can finish feeding with formula as needed. AVERAGE INTAKES OF COLOSTRUM BY HEALTHY  INFANTS:  Time  Day Intake (ml per feeding)  Based on 8 feedings per day. 1st 24 hrs  1 2-10 ml  24-48 hrs  2 5-15 ml  48-72 hrs  3 15-30 ml (0.5-1 oz) Amount per feeding  72-96 hrs  4 30-45 ml (1-1.5oz)                          5-6       45-60 ml (1.5-2oz)                           7          60-75ml (2-2.5oz)    By day 7, baby will need 60-75 ml or 2-2.5 oz at each feeding based on 8 feedings per day & babys weight. (1oz = 30ml). Total milk volume needed in 24 hours by Day 7 is 17-19 oz per day based on baby's birthweight of 7-0. The more often baby eats, the less volume they need per feeding. If baby is eating more often than the minimum of 8 times per day, they may take less per feeding. Comments: If pumping, suggest using olive oil or coconut oil on your nipples before pumping to help reduce the friction.     Formula storage  1 hour at room temp  24 hours in fridge   Discard any milk that is left in bottle after feeding

## 2021-09-13 NOTE — LACTATION NOTE
This note was copied from a baby's chart. IN to see mom and infant for the first time. Experienced mom stated that infant is latching and nursing well. Infant was latched on her right breast in the football hold and sucking rhythmically. Mom stated that she is nursing much better than her first. Reviewed the expectations of the first 24 hours as well as the second night. Lactation consultant will follow up tomorrow.

## 2021-09-14 ENCOUNTER — PATIENT OUTREACH (OUTPATIENT)
Dept: OTHER | Age: 31
End: 2021-09-14

## 2021-09-14 NOTE — PROGRESS NOTES
Attempted to reach this pt for pp f/u call. Unable to reach pt and cpuld not leave vm her mailbox was not set up. Will continue to follow. f/u pp call high risk  Delivery of a 3.185 kg female infant via Vaginal, Spontaneous on 9/12/2021 at 3:47 AM  by Indira Kaur.  Apgars were 8  and 9 .  39 weeks induction [facility]

## 2021-10-29 ENCOUNTER — PATIENT OUTREACH (OUTPATIENT)
Dept: OTHER | Age: 31
End: 2021-10-29

## 2021-10-29 NOTE — PROGRESS NOTES
Attempted to reach this pt she does not have her vm set up to leave a message. Will resolve this episode at this time.

## 2021-10-29 NOTE — PROGRESS NOTES
Pt called me back. She is doing well overall and baby doing well too. Pt without additional concerns for pp depression, she is currently on zoloft. Encouraged pt to choose pcp for establishing care for additional annual f/u care. Pt had 6 week pp appointment and has decided on bc that the ob will send a script over for this. We discussed open enrollment since pt is off from work and covid vaccine benefits. Pt is not vaccinated yet and was not planning to get the vaccine. The pt plans to receive flu vaccine prior to rtw in December notified pt of the deadline to have this done. No additional questions or concerns. Resolving current episode . No further ED/UC or hospital admissions within 30 days post discharge. Patient attended follow-up appointments as directed. No outreach from patient to [address] [place] [address].

## 2022-03-19 PROBLEM — O34.43 HISTORY OF LOOP ELECTROSURGICAL EXCISION PROCEDURE (LEEP) OF CERVIX AFFECTING PREGNANCY IN THIRD TRIMESTER: Status: ACTIVE | Noted: 2021-02-04

## 2022-03-19 PROBLEM — O09.90 SUPERVISION OF HIGH RISK PREGNANCY, ANTEPARTUM: Status: ACTIVE | Noted: 2021-02-05

## 2022-03-19 PROBLEM — Z98.890 HISTORY OF LOOP ELECTROSURGICAL EXCISION PROCEDURE (LEEP) OF CERVIX AFFECTING PREGNANCY IN THIRD TRIMESTER: Status: ACTIVE | Noted: 2021-02-04

## 2022-03-19 PROBLEM — Z34.90 ENCOUNTER FOR INDUCTION OF LABOR: Status: ACTIVE | Noted: 2021-09-11

## 2022-03-19 PROBLEM — M54.9 BACK PAIN AFFECTING PREGNANCY IN THIRD TRIMESTER: Status: ACTIVE | Noted: 2021-08-26

## 2022-03-20 PROBLEM — O99.213 OBESITY AFFECTING PREGNANCY IN THIRD TRIMESTER: Status: ACTIVE | Noted: 2021-08-02

## 2022-12-02 ENCOUNTER — OFFICE VISIT (OUTPATIENT)
Dept: OCCUPATIONAL MEDICINE | Age: 32
End: 2022-12-02

## 2022-12-02 VITALS
RESPIRATION RATE: 14 BRPM | SYSTOLIC BLOOD PRESSURE: 129 MMHG | HEART RATE: 90 BPM | OXYGEN SATURATION: 98 % | DIASTOLIC BLOOD PRESSURE: 90 MMHG | TEMPERATURE: 98.2 F

## 2022-12-02 DIAGNOSIS — R05.1 ACUTE COUGH: ICD-10-CM

## 2022-12-02 DIAGNOSIS — J10.1 INFLUENZA A VIRUS PRESENT: ICD-10-CM

## 2022-12-02 DIAGNOSIS — R52 GENERALIZED BODY ACHES: Primary | ICD-10-CM

## 2022-12-02 LAB
INFLUENZA A ANTIGEN, POC: POSITIVE
INFLUENZA B ANTIGEN, POC: NEGATIVE
SARS-COV-2, POC: NORMAL
VALID INTERNAL CONTROL, POC: YES

## 2022-12-02 RX ORDER — SERTRALINE HYDROCHLORIDE 100 MG/1
100 TABLET, FILM COATED ORAL DAILY
COMMUNITY

## 2022-12-02 RX ORDER — FLUTICASONE PROPIONATE 50 MCG
2 SPRAY, SUSPENSION (ML) NASAL DAILY
Qty: 16 G | Refills: 0 | Status: SHIPPED | OUTPATIENT
Start: 2022-12-02

## 2022-12-02 RX ORDER — BENZONATATE 100 MG/1
100-200 CAPSULE ORAL 3 TIMES DAILY PRN
Qty: 10 CAPSULE | Refills: 0 | Status: SHIPPED | OUTPATIENT
Start: 2022-12-02 | End: 2022-12-05

## 2022-12-02 ASSESSMENT — ENCOUNTER SYMPTOMS
STRIDOR: 0
TROUBLE SWALLOWING: 0
RHINORRHEA: 0
SINUS PRESSURE: 1
COUGH: 1
SORE THROAT: 0
FACIAL SWELLING: 0
SINUS PAIN: 0
SHORTNESS OF BREATH: 0

## 2022-12-02 NOTE — PROGRESS NOTES
Subjective:      Patient ID: Khadra Landry is a 29 y.o. female. Cough  This is a new problem. Episode onset: 1 week. The problem has been unchanged. The cough is Non-productive. Associated symptoms include chills, myalgias, nasal congestion and postnasal drip. Pertinent negatives include no ear congestion, ear pain, headaches, rhinorrhea, sore throat or shortness of breath. The symptoms are aggravated by lying down. She has tried nothing for the symptoms. There is no history of asthma or bronchitis. She reports her daughter was diagnosed with Influenza A about 2-3 days before she started feeling ill. Review of Systems   Constitutional:  Positive for chills. Negative for fatigue. HENT:  Positive for congestion, postnasal drip and sinus pressure. Negative for ear pain, facial swelling, rhinorrhea, sinus pain, sneezing, sore throat and trouble swallowing. Respiratory:  Positive for cough. Negative for shortness of breath and stridor. Cardiovascular: Negative. Musculoskeletal:  Positive for myalgias. Neurological:  Negative for dizziness and headaches. No Known Allergies   Current Outpatient Medications on File Prior to Visit   Medication Sig Dispense Refill    sertraline (ZOLOFT) 100 MG tablet Take 100 mg by mouth daily       No current facility-administered medications on file prior to visit. Objective:   Physical Exam  Vitals reviewed. Constitutional:       General: She is not in acute distress. Appearance: Normal appearance. She is not ill-appearing. HENT:      Head: Normocephalic. Right Ear: Hearing, tympanic membrane, ear canal and external ear normal.      Left Ear: Hearing, tympanic membrane, ear canal and external ear normal.      Nose: Mucosal edema and congestion present. Right Sinus: No maxillary sinus tenderness or frontal sinus tenderness. Left Sinus: No maxillary sinus tenderness or frontal sinus tenderness. Mouth/Throat:      Lips: Pink. Mouth: Mucous membranes are moist.      Pharynx: Uvula midline. Posterior oropharyngeal erythema present. No pharyngeal swelling, oropharyngeal exudate or uvula swelling. Tonsils: No tonsillar exudate. 1+ on the right. 1+ on the left. Comments: Posterior pharynx- Thick secretions, most likely post nasal drip- clear  Cardiovascular:      Rate and Rhythm: Normal rate and regular rhythm. Heart sounds: Normal heart sounds. Pulmonary:      Effort: Pulmonary effort is normal. No respiratory distress. Breath sounds: Normal breath sounds. No stridor. No wheezing, rhonchi or rales. Chest:      Chest wall: No tenderness. Skin:     General: Skin is warm and dry. Neurological:      Mental Status: She is alert and oriented to person, place, and time. Psychiatric:         Mood and Affect: Mood normal.         Behavior: Behavior normal.         Thought Content: Thought content normal.         Judgment: Judgment normal.     POC Influenza A- positive; B- Negative  POC SARs Co2- not detected    Vitals:    12/02/22 1003   BP: (!) 129/90   Pulse: 90   Resp: 14   Temp: 98.2 °F (36.8 °C)   SpO2: 98%        Assessment:       Diagnosis Orders   1. Generalized body aches  AMB POC SARS-COV-2    AMB POC RAPID INFLUENZA TEST      2. Acute cough  AMB POC SARS-COV-2    AMB POC RAPID INFLUENZA TEST      3. Influenza A virus present               Plan:      P:    * Rxs provided: Tessalon Perles 100-200 mg po every 8 hour prn cough, for up to 3 days  Flonase nasal spray, 2 sprays each nostril daily, #1 NR (start Claritin daily, If symptoms not improving start Flonase daily  Recommendations:  May use cool-mist humidifier/Vaporizer at bedside/living quarters. Recommended saline rinses with OceanMist spray or using NettiPot. Encouraged increase fluid intake for adequate hydration. Reviewed good hand hygiene. Tylenol/Ibuprofen for aches/pains.     Throat lozenges, honey, or warm salt water gargles for sore throat. Informed symptoms should last for 5-7 days but may last up to 2 weeks. Cough may persist after cold symptoms resolve. Risks and benefits of medications reviewed with patient . Follow up: 3-5 days if not any better or worse. Urgent Care symptoms worsening and PCP and [facility]     Seek ER care for chest pain or SOB. Patient voices understanding and agrees with the plan of care as described above. Labs performed/ordered:  Educational information:  Educational material reviewed and provided at checkout re:_____  Follow up: Patient was encouraged to return to the clinic and/or PCP if s/s persist or do not resolve completely. Also advised to seek emergent care if worsening signs and symptoms warrant immediate evaluation including, but not limited to HA, blurred vision, speech disturbance, difficulty with ambulation/gait, numbness, tingling, weakness, syncope, abdominal pain, chest pain, or shortness of breath. *Side effects, adverse effects, risks versus benefits associated with medications prescribed/recommended were discussed with the patient. Patient verbalized understanding. All questions answered.       * I have reviewed the patient's medication list, past medical, family, social, and surgical    history and updated the patient record appropriately      Social History     Socioeconomic History    Marital status: Single     Spouse name: Not on file    Number of children: Not on file    Years of education: Not on file    Highest education level: Not on file   Occupational History    Not on file   Tobacco Use    Smoking status: Never    Smokeless tobacco: Never   Substance and Sexual Activity    Alcohol use: Never    Drug use: Never    Sexual activity: Not on file   Other Topics Concern    Not on file   Social History Narrative    Not on file     Social Determinants of Health     Financial Resource Strain: Not on file   Food Insecurity: Not on file   Transportation Needs: Not on file Physical Activity: Not on file   Stress: Not on file   Social Connections: Not on file   Intimate Partner Violence: Not on file   Housing Stability: Not on file     Past Medical History:   Diagnosis Date    Abnormal Papanicolaou smear of cervix     Anemia during pregnancy in third trimester 8/26/2021    Anxiety     BMI 31.0-31.9,adult     Chlamydia     Depression     Genital herpes     High grade squamous intraepithelial lesion (HGSIL), grade 3 BETH, on biopsy of cervix     LEEP     History of gestational hypertension     HSV-1 infection     Recurrent major depressive disorder, in partial remission ([facility] [address] 1/25/2021    Thrombocytopenia (HCC)      Past Surgical History:   Procedure Laterality Date    BREAST SURGERY      COLPOSCOPY      LEEP      WISDOM TOOTH EXTRACTION       Family History   Problem Relation Age of Onset    Colon Cancer Father 48    Ovarian Cancer Neg Hx     Diabetes Paternal Grandfather     Breast Cancer Maternal Aunt 27    Cancer Father         Leukemia    Hypertension Father             Naif Nieves.  Brendan Hussein CNP

## 2023-01-27 ENCOUNTER — OFFICE VISIT (OUTPATIENT)
Dept: OCCUPATIONAL MEDICINE | Age: 33
End: 2023-01-27

## 2023-01-27 VITALS
RESPIRATION RATE: 14 BRPM | HEART RATE: 90 BPM | DIASTOLIC BLOOD PRESSURE: 81 MMHG | TEMPERATURE: 98.2 F | SYSTOLIC BLOOD PRESSURE: 132 MMHG | OXYGEN SATURATION: 97 %

## 2023-01-27 DIAGNOSIS — B37.31 VAGINAL YEAST INFECTION: Primary | ICD-10-CM

## 2023-01-27 RX ORDER — SERTRALINE HYDROCHLORIDE 100 MG/1
100 TABLET, FILM COATED ORAL DAILY
COMMUNITY

## 2023-01-27 RX ORDER — FLUCONAZOLE 150 MG/1
150 TABLET ORAL ONCE
Qty: 1 TABLET | Refills: 0 | Status: SHIPPED | OUTPATIENT
Start: 2023-01-27 | End: 2023-01-27

## 2023-01-27 ASSESSMENT — ENCOUNTER SYMPTOMS
DIARRHEA: 0
BACK PAIN: 0
SORE THROAT: 0
CONSTIPATION: 0
ABDOMINAL PAIN: 0
NAUSEA: 0

## 2023-01-27 NOTE — PROGRESS NOTES
Subjective:      Patient ID: Rowena Harris is a 32 y.o. female.    Vaginal Itching  The patient's primary symptoms include genital itching. The patient's pertinent negatives include no genital lesions, genital odor, genital rash or pelvic pain. This is a new problem. The current episode started in the past 7 days (She was prescribed an antibiotic about a month ago and usually gets yeast infections after taking). The problem occurs constantly. The problem has been gradually worsening. The patient is experiencing no pain. She is not pregnant. Pertinent negatives include no abdominal pain, back pain, chills, constipation, diarrhea, discolored urine, dysuria, fever, flank pain, frequency, hematuria, nausea, painful intercourse, rash, sore throat or urgency. Treatments tried: yogurts. The treatment provided no relief. No, her partner does not have an STD. Her menstrual history has been regular (Currently on menses).     Review of Systems   Constitutional:  Negative for chills, fatigue and fever.   HENT:  Negative for sore throat.    Gastrointestinal:  Negative for abdominal pain, constipation, diarrhea and nausea.   Genitourinary:  Negative for difficulty urinating, dysuria, flank pain, frequency, genital sores, hematuria, menstrual problem, pelvic pain, urgency and vaginal pain.        Vaginal itching   Musculoskeletal:  Negative for back pain and myalgias.   Skin:  Negative for rash.     No Known Allergies   Current Outpatient Medications on File Prior to Visit   Medication Sig Dispense Refill    sertraline (ZOLOFT) 100 MG tablet Take 100 mg by mouth daily      Misc. Devices KIT Certifi Prenatal Gummy/Ferrous gluconate 324mg ([facility] Baby Box);  Take 2 prenatal gummies and 1 ferrous gluconate tablet po QD or as instructed by provider;  #qs for 3 months (Patient not taking: Reported on 1/27/2023) 1 kit 3     No current facility-administered medications on file prior to visit.        Objective:   Physical Exam  Vitals  reviewed. Constitutional:       General: She is not in acute distress. Appearance: Normal appearance. She is not ill-appearing. Genitourinary:     Comments: Differed exam  Skin:     General: Skin is warm and dry. Neurological:      General: No focal deficit present. Mental Status: She is alert and oriented to person, place, and time. Psychiatric:         Mood and Affect: Mood normal.         Behavior: Behavior normal.         Thought Content: Thought content normal.         Judgment: Judgment normal.   Differed complete exam    Vitals:    01/27/23 1220   BP: 132/81   Pulse: 90   Resp: 14   Temp: 98.2 °F (36.8 °C)   SpO2: 97%        Assessment:       Diagnosis Orders   1. Vaginal yeast infection  fluconazole (DIFLUCAN) 150 MG tablet             Plan:      P:    * Rxs provided: Diflucan 150 mg one po daily x 1 day, #1, NR  Recommendations:   Side effects and risks vs benefits associated with medication discussed with pt and pt reports understanding  Encouraged to push PO fluid intake and refrain from holding her urine if at all possible  Discussed good feminine hygiene practices to prevent future occurrences   Use backup birth control method    Labs performed/ordered:  Educational information:  Educational material reviewed and provided at checkout re:_____  Follow up: Patient was encouraged to return to the clinic and/or PCP if s/s persist or do not resolve completely. Also advised to seek emergent care if worsening signs and symptoms warrant immediate evaluation including, but not limited to HA, blurred vision, speech disturbance, difficulty with ambulation/gait, numbness, tingling, weakness, syncope, abdominal pain, chest pain, or shortness of breath. *Side effects, adverse effects, risks versus benefits associated with medications prescribed/recommended were discussed with the patient. Patient verbalized understanding. All questions answered.       * I have reviewed the patient's medication list, past medical, family, social, and surgical    history and updated the patient record appropriately      Social History     Socioeconomic History    Marital status:      Spouse name: Not on file    Number of children: Not on file    Years of education: Not on file    Highest education level: Not on file   Occupational History    Not on file   Tobacco Use    Smoking status: Never    Smokeless tobacco: Never   Substance and Sexual Activity    Alcohol use: Not Currently    Drug use: Not on file    Sexual activity: Not on file   Other Topics Concern    Not on file   Social History Narrative    Not on file     Social Determinants of Health     Financial Resource Strain: Not on file   Food Insecurity: Not on file   Transportation Needs: Not on file   Physical Activity: Not on file   Stress: Not on file   Social Connections: Not on file   Intimate Partner Violence: Not on file   Housing Stability: Not on file     Past Medical History:   Diagnosis Date    Anxiety      Past Surgical History:   Procedure Laterality Date    BREAST ENHANCEMENT SURGERY Bilateral     WISDOM TOOTH EXTRACTION Bilateral 2008     Family History   Problem Relation Age of Onset    Anxiety Disorder Mother     High Blood Pressure Father     Cancer Father     Leukemia Father     Anxiety Disorder Father             Trey Giordano.  Tao Zhou - TERRELL

## 2023-02-10 ENCOUNTER — OFFICE VISIT (OUTPATIENT)
Dept: OCCUPATIONAL MEDICINE | Age: 33
End: 2023-02-10

## 2023-02-10 VITALS
DIASTOLIC BLOOD PRESSURE: 80 MMHG | HEART RATE: 92 BPM | SYSTOLIC BLOOD PRESSURE: 122 MMHG | RESPIRATION RATE: 16 BRPM | OXYGEN SATURATION: 96 % | TEMPERATURE: 98.7 F

## 2023-02-10 DIAGNOSIS — B37.9 ANTIBIOTIC-INDUCED YEAST INFECTION: Primary | ICD-10-CM

## 2023-02-10 DIAGNOSIS — J02.9 SORE THROAT: ICD-10-CM

## 2023-02-10 DIAGNOSIS — J02.0 STREP PHARYNGITIS: ICD-10-CM

## 2023-02-10 DIAGNOSIS — T36.95XA ANTIBIOTIC-INDUCED YEAST INFECTION: Primary | ICD-10-CM

## 2023-02-10 PROBLEM — M54.9 BACK PAIN AFFECTING PREGNANCY IN THIRD TRIMESTER: Status: RESOLVED | Noted: 2021-08-26 | Resolved: 2023-02-10

## 2023-02-10 PROBLEM — O24.414 INSULIN CONTROLLED GESTATIONAL DIABETES MELLITUS (GDM) IN THIRD TRIMESTER: Status: RESOLVED | Noted: 2021-06-24 | Resolved: 2023-02-10

## 2023-02-10 LAB
GROUP A STREP ANTIGEN, POC: POSITIVE
VALID INTERNAL CONTROL, POC: YES

## 2023-02-10 RX ORDER — CEPHALEXIN 500 MG/1
500 CAPSULE ORAL 2 TIMES DAILY
Qty: 20 CAPSULE | Refills: 0 | Status: SHIPPED | OUTPATIENT
Start: 2023-02-10 | End: 2023-02-20

## 2023-02-10 RX ORDER — FLUCONAZOLE 150 MG/1
TABLET ORAL
Qty: 2 TABLET | Refills: 0 | Status: SHIPPED | OUTPATIENT
Start: 2023-02-10

## 2023-02-10 ASSESSMENT — ENCOUNTER SYMPTOMS
SWOLLEN GLANDS: 1
COUGH: 1
CHOKING: 0
ABDOMINAL PAIN: 0
SINUS PAIN: 0
STRIDOR: 0
SORE THROAT: 1
SINUS PRESSURE: 0
WHEEZING: 0
NAUSEA: 0
SHORTNESS OF BREATH: 0

## 2023-02-10 NOTE — PROGRESS NOTES
Subjective:      Patient ID: Cheri Franklin is a 29 y.o. female. Pharyngitis  This is a new problem. Episode onset: 2days. The problem has been gradually worsening. Associated symptoms include congestion, coughing, a sore throat and swollen glands. Pertinent negatives include no abdominal pain, chills, fatigue, fever, headaches, myalgias, nausea, neck pain, rash or vertigo. Associated symptoms comments: Sinus headach. She has tried nothing for the symptoms. Improvement on treatment: She reports she was seen in the [address] [place] [address] on 12/2022 for postive strep throat and treated with 10 days of amoxicillin, symptoms improved. Review of Systems   Constitutional:  Negative for chills, fatigue and fever. HENT:  Positive for congestion, postnasal drip and sore throat. Negative for drooling, ear pain, mouth sores, sinus pressure, sinus pain and sneezing. Respiratory:  Positive for cough. Negative for choking, shortness of breath, wheezing and stridor. Cardiovascular: Negative. Gastrointestinal:  Negative for abdominal pain and nausea. Musculoskeletal:  Negative for myalgias and neck pain. Skin:  Negative for rash. Neurological:  Negative for dizziness, vertigo and headaches. No Known Allergies   Current Outpatient Medications on File Prior to Visit   Medication Sig Dispense Refill    sertraline (ZOLOFT) 100 MG tablet Take 100 mg by mouth daily      fluticasone (FLONASE) 50 MCG/ACT nasal spray 2 sprays by Each Nostril route daily (Patient not taking: Reported on 2/10/2023) 16 g 0     No current facility-administered medications on file prior to visit. Objective:   Physical Exam  Vitals reviewed. Constitutional:       Appearance: Normal appearance. HENT:      Head: Normocephalic. Right Ear: Hearing, tympanic membrane, ear canal and external ear normal.      Left Ear: Hearing, tympanic membrane, ear canal and external ear normal.      Nose: Mucosal edema present.       Right Sinus: No maxillary sinus tenderness or frontal sinus tenderness. Left Sinus: No maxillary sinus tenderness or frontal sinus tenderness. Mouth/Throat:      Lips: Pink. Mouth: Mucous membranes are moist.      Pharynx: Uvula midline. Pharyngeal swelling, oropharyngeal exudate and posterior oropharyngeal erythema present. No uvula swelling. Tonsils: No tonsillar exudate or tonsillar abscesses. 3+ on the right. 3+ on the left. Cardiovascular:      Rate and Rhythm: Normal rate and regular rhythm. Heart sounds: Normal heart sounds. Pulmonary:      Effort: Pulmonary effort is normal. No respiratory distress. Breath sounds: Normal breath sounds. No stridor. No wheezing. Skin:     General: Skin is warm and dry. Neurological:      General: No focal deficit present. Mental Status: She is alert and oriented to person, place, and time. Psychiatric:         Mood and Affect: Mood normal.         Behavior: Behavior normal.         Thought Content: Thought content normal.         Judgment: Judgment normal.   POC strep- positive    Vitals:    02/10/23 1355   BP: 122/80   Pulse: 92   Resp: 16   Temp: 98.7 °F (37.1 °C)   SpO2: 96%        Assessment:       Diagnosis Orders   1. Antibiotic-induced yeast infection  fluconazole (DIFLUCAN) 150 MG tablet      2. Strep pharyngitis  cephALEXin (KEFLEX) 500 MG capsule      3. Sore throat  AMB POC RAPID STREP A             Plan:      P:    * Rxs provided: Keflex 500 mg one po BID x 10 days, #20, NR- get a new toothbrush after 24 hours on antibiotics  Diflucan 150 mg one po daily x 1 days, may repeat in 3-5 days if symptoms persist, #2, NR- encouraged probiotics daily to prevent yeast infection and if symptoms start take medication as prescribed  Recommendations:  Note given for RTW in 24 hours after taking antibiotics  Encouraged increase fluid intake for adequate hydration. Reviewed good hand hygiene. Tylenol/Ibuprofen for aches/pains.     Throat lozenges, honey, or warm salt water gargles for sore throat. Risks and benefits of medications reviewed with patient . Follow up: 3-5 days if not any better or worse or development of high fever. Seek ER care for chest pain or SOB. Patient voices understanding and agrees with the plan of care as described above. Labs performed/ordered:  Educational information:  Educational material reviewed and provided at checkout re:_____  Follow up: Patient was encouraged to return to the clinic and/or PCP if s/s persist or do not resolve completely. Also advised to seek emergent care if worsening signs and symptoms warrant immediate evaluation including, but not limited to HA, blurred vision, speech disturbance, difficulty with ambulation/gait, numbness, tingling, weakness, syncope, abdominal pain, chest pain, or shortness of breath. *Side effects, adverse effects, risks versus benefits associated with medications prescribed/recommended were discussed with the patient. Patient verbalized understanding. All questions answered.       * I have reviewed the patient's medication list, past medical, family, social, and surgical    history and updated the patient record appropriately      Social History     Socioeconomic History    Marital status: Single     Spouse name: Not on file    Number of children: Not on file    Years of education: Not on file    Highest education level: Not on file   Occupational History    Not on file   Tobacco Use    Smoking status: Never    Smokeless tobacco: Never   Substance and Sexual Activity    Alcohol use: Never    Drug use: Never    Sexual activity: Not on file   Other Topics Concern    Not on file   Social History Narrative    Not on file     Social Determinants of Health     Financial Resource Strain: Not on file   Food Insecurity: Not on file   Transportation Needs: Not on file   Physical Activity: Not on file   Stress: Not on file   Social Connections: Not on file   Intimate Partner Violence: Not on file   Housing Stability: Not on file     Past Medical History:   Diagnosis Date    Abnormal Papanicolaou smear of cervix     Anemia during pregnancy in third trimester 8/26/2021    Anxiety     BMI 31.0-31.9,adult     Chlamydia     Depression     Genital herpes     High grade squamous intraepithelial lesion (HGSIL), grade 3 BETH, on biopsy of cervix     LEEP     History of gestational hypertension     HSV-1 infection     Recurrent major depressive disorder, in partial remission ([facility] [address] 1/25/2021    Thrombocytopenia (HCC)      Past Surgical History:   Procedure Laterality Date    BREAST SURGERY      COLPOSCOPY      LEEP      WISDOM TOOTH EXTRACTION       Family History   Problem Relation Age of Onset    Colon Cancer Father 48    Ovarian Cancer Neg Hx     Diabetes Paternal Grandfather     Breast Cancer Maternal Aunt 27    Cancer Father         Leukemia    Hypertension Father             Thor Fraise.  Arminda Lowry - CNP

## 2023-02-13 ENCOUNTER — TELEPHONE (OUTPATIENT)
Dept: OCCUPATIONAL MEDICINE | Age: 33
End: 2023-02-13

## 2023-02-13 PROBLEM — R87.613 HGSIL ON PAP SMEAR OF CERVIX: Status: ACTIVE | Noted: 2020-01-28

## 2023-02-13 RX ORDER — VALACYCLOVIR HYDROCHLORIDE 500 MG/1
1000 TABLET, FILM COATED ORAL DAILY
COMMUNITY
Start: 2020-01-28

## 2023-02-13 RX ORDER — PENICILLIN V POTASSIUM 500 MG/1
TABLET ORAL
COMMUNITY
Start: 2022-12-16

## 2023-02-13 RX ORDER — FAMOTIDINE 20 MG/1
20 TABLET, FILM COATED ORAL 2 TIMES DAILY
COMMUNITY

## 2023-02-13 RX ORDER — ORPHENADRINE CITRATE 100 MG/1
100 TABLET, EXTENDED RELEASE ORAL
COMMUNITY
Start: 2020-11-30

## 2023-02-13 RX ORDER — SERTRALINE HYDROCHLORIDE 100 MG/1
100 TABLET, FILM COATED ORAL DAILY
COMMUNITY
Start: 2020-11-30

## 2023-02-13 RX ORDER — BUTALBITAL, ACETAMINOPHEN AND CAFFEINE 50; 325; 40 MG/1; MG/1; MG/1
1 TABLET ORAL EVERY 6 HOURS PRN
COMMUNITY
Start: 2020-11-30

## 2023-02-13 RX ORDER — ONDANSETRON 4 MG/1
4 TABLET, ORALLY DISINTEGRATING ORAL 3 TIMES DAILY PRN
COMMUNITY
Start: 2020-11-30

## 2023-02-13 RX ORDER — IBUPROFEN 600 MG/1
600 TABLET ORAL EVERY 8 HOURS PRN
COMMUNITY
Start: 2020-11-30

## 2023-02-13 NOTE — TELEPHONE ENCOUNTER
Called pt regarding how she is feeling after being diagnosed with strep last Friday. Pt states her throat is starting to feel better and she did work yesterday. Pt informed to call us or return to clinic is her sore throat returns or she develops a high fever or rash. Pt agrees with plan of care.

## 2023-03-24 ENCOUNTER — OFFICE VISIT (OUTPATIENT)
Dept: OCCUPATIONAL MEDICINE | Age: 33
End: 2023-03-24

## 2023-03-24 VITALS
HEART RATE: 90 BPM | SYSTOLIC BLOOD PRESSURE: 120 MMHG | RESPIRATION RATE: 16 BRPM | TEMPERATURE: 98.2 F | DIASTOLIC BLOOD PRESSURE: 72 MMHG | OXYGEN SATURATION: 98 %

## 2023-03-24 DIAGNOSIS — B37.9 ANTIBIOTIC-INDUCED YEAST INFECTION: ICD-10-CM

## 2023-03-24 DIAGNOSIS — J01.00 ACUTE MAXILLARY SINUSITIS, RECURRENCE NOT SPECIFIED: ICD-10-CM

## 2023-03-24 DIAGNOSIS — J35.1 ENLARGED TONSILS: ICD-10-CM

## 2023-03-24 DIAGNOSIS — T36.95XA ANTIBIOTIC-INDUCED YEAST INFECTION: ICD-10-CM

## 2023-03-24 DIAGNOSIS — J03.01 ACUTE RECURRENT STREPTOCOCCAL TONSILLITIS: Primary | ICD-10-CM

## 2023-03-24 LAB
GROUP A STREP ANTIGEN, POC: POSITIVE
VALID INTERNAL CONTROL, POC: YES

## 2023-03-24 RX ORDER — FLUCONAZOLE 150 MG/1
TABLET ORAL
Qty: 2 TABLET | Refills: 0 | Status: SHIPPED | OUTPATIENT
Start: 2023-03-24

## 2023-03-24 RX ORDER — AMOXICILLIN AND CLAVULANATE POTASSIUM 875; 125 MG/1; MG/1
1 TABLET, FILM COATED ORAL 2 TIMES DAILY
Qty: 20 TABLET | Refills: 0 | Status: SHIPPED | OUTPATIENT
Start: 2023-03-24 | End: 2023-04-03

## 2023-03-24 ASSESSMENT — ENCOUNTER SYMPTOMS
VOICE CHANGE: 0
STRIDOR: 0
SHORTNESS OF BREATH: 0
WHEEZING: 0
SORE THROAT: 0
SINUS PRESSURE: 1
RHINORRHEA: 1
FACIAL SWELLING: 0
SINUS PAIN: 1
TROUBLE SWALLOWING: 0
CHEST TIGHTNESS: 0
COUGH: 1

## 2023-03-24 NOTE — PROGRESS NOTES
Subjective:      Patient ID: Ynoy Rosario is a 29 y.o. female. Sinusitis  This is a new problem. Episode onset: 10 days. The problem has been gradually worsening since onset. There has been no fever. Associated symptoms include congestion, coughing, headaches and sinus pressure. Pertinent negatives include no chills, ear pain, shortness of breath, sneezing or sore throat. (Teeth on the left side hurt- went to the dentist and she reports there was no cavities on exam and xrays normal) Treatments tried: Zyrtec. The treatment provided no relief. Review of Systems   Constitutional:  Negative for chills, fatigue and fever. HENT:  Positive for congestion, dental problem, postnasal drip, rhinorrhea, sinus pressure and sinus pain. Negative for drooling, ear pain, facial swelling, sneezing, sore throat, trouble swallowing and voice change. Yellow drainage from nose, upper and lower teeth hurt   Respiratory:  Positive for cough. Negative for chest tightness, shortness of breath, wheezing and stridor. Cardiovascular: Negative. Musculoskeletal:  Negative for myalgias. Allergic/Immunologic: Positive for environmental allergies. Neurological:  Positive for headaches. Negative for dizziness and light-headedness.      No Known Allergies   Current Outpatient Medications on File Prior to Visit   Medication Sig Dispense Refill    ibuprofen (ADVIL;MOTRIN) 600 MG tablet Take 600 mg by mouth every 8 hours as needed      sertraline (ZOLOFT) 100 MG tablet Take 100 mg by mouth daily      butalbital-acetaminophen-caffeine (FIORICET, ESGIC) -40 MG per tablet Take 1 tablet by mouth every 6 hours as needed (Patient not taking: Reported on 3/24/2023)      famotidine (PEPCID) 20 MG tablet Take 20 mg by mouth 2 times daily (Patient not taking: Reported on 3/24/2023)      ondansetron (ZOFRAN-ODT) 4 MG disintegrating tablet Take 4 mg by mouth 3 times daily as needed (Patient not taking: Reported on 3/24/2023)

## 2023-03-26 LAB
BACTERIA SPEC CULT: ABNORMAL
BACTERIA SPEC CULT: ABNORMAL
SERVICE CMNT-IMP: ABNORMAL